# Patient Record
Sex: FEMALE | Race: WHITE | Employment: PART TIME | URBAN - METROPOLITAN AREA
[De-identification: names, ages, dates, MRNs, and addresses within clinical notes are randomized per-mention and may not be internally consistent; named-entity substitution may affect disease eponyms.]

---
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
## 2019-06-03 ENCOUNTER — HOSPITAL ENCOUNTER (OUTPATIENT)
Age: 19
Discharge: HOME | End: 2019-06-03
Attending: FAMILY MEDICINE
Payer: COMMERCIAL

## 2019-06-03 VITALS
TEMPERATURE: 98 F | SYSTOLIC BLOOD PRESSURE: 156 MMHG | DIASTOLIC BLOOD PRESSURE: 86 MMHG | OXYGEN SATURATION: 98 % | RESPIRATION RATE: 20 BRPM | HEART RATE: 81 BPM

## 2019-06-03 DIAGNOSIS — L03.011 PARONYCHIA OF RIGHT INDEX FINGER: Primary | ICD-10-CM

## 2019-06-03 DIAGNOSIS — R03.0 ELEVATED BLOOD PRESSURE READING: ICD-10-CM

## 2019-06-03 PROCEDURE — 99213 OFFICE O/P EST LOW 20 MIN: CPT

## 2019-06-03 PROCEDURE — 99214 OFFICE O/P EST MOD 30 MIN: CPT

## 2019-06-03 RX ORDER — FLUCONAZOLE 150 MG/1
TABLET ORAL
Qty: 2 TABLET | Refills: 0 | Status: SHIPPED | OUTPATIENT
Start: 2019-06-03 | End: 2019-06-06

## 2019-06-03 RX ORDER — AMOXICILLIN AND CLAVULANATE POTASSIUM 875; 125 MG/1; MG/1
1 TABLET, FILM COATED ORAL 2 TIMES DAILY
Qty: 14 TABLET | Refills: 0 | Status: SHIPPED | OUTPATIENT
Start: 2019-06-03 | End: 2019-06-10

## 2019-06-03 NOTE — ED PROVIDER NOTES
[place] [address] [facility] [place]   Patient presents with: Other    Stated Complaint: swollen pointer finger    HPI  21 yo F here with complaints of swelling and redness of the area around the nail of the R index finger.  Nail b normal   - Cap refill: normal   - sensation: normal   - Motor exam/strength/hand : normal     ED Course   Labs Reviewed - No data to display  Orders Placed This Encounter      Amoxicillin-Pot Clavulanate (AUGMENTIN) 875-125 MG Oral Tab          Sig: Ta

## 2019-07-01 ENCOUNTER — NURSE ONLY (OUTPATIENT)
Dept: FAMILY MEDICINE CLINIC | Facility: CLINIC | Age: 19
End: 2019-07-01
Payer: COMMERCIAL

## 2019-07-01 ENCOUNTER — TELEPHONE (OUTPATIENT)
Dept: FAMILY MEDICINE CLINIC | Facility: CLINIC | Age: 19
End: 2019-07-01

## 2019-07-01 DIAGNOSIS — R30.0 DYSURIA: ICD-10-CM

## 2019-07-01 DIAGNOSIS — R35.0 URINARY FREQUENCY: Primary | ICD-10-CM

## 2019-07-01 LAB
BILIRUBIN: NEGATIVE
GLUCOSE (URINE DIPSTICK): NEGATIVE MG/DL
KETONES (URINE DIPSTICK): NEGATIVE MG/DL
MULTISTIX LOT#: NORMAL NUMERIC
NITRITE, URINE: NEGATIVE
PH, URINE: 7 (ref 4.5–8)
PROTEIN (URINE DIPSTICK): NEGATIVE MG/DL
SPECIFIC GRAVITY: 1.01 (ref 1–1.03)
UROBILINOGEN,SEMI-QN: 0.2 MG/DL (ref 0–1.9)

## 2019-07-01 PROCEDURE — 87086 URINE CULTURE/COLONY COUNT: CPT | Performed by: FAMILY MEDICINE

## 2019-07-01 PROCEDURE — 87186 SC STD MICRODIL/AGAR DIL: CPT | Performed by: FAMILY MEDICINE

## 2019-07-01 PROCEDURE — 81003 URINALYSIS AUTO W/O SCOPE: CPT | Performed by: FAMILY MEDICINE

## 2019-07-01 PROCEDURE — 87088 URINE BACTERIA CULTURE: CPT | Performed by: FAMILY MEDICINE

## 2019-07-01 RX ORDER — SULFAMETHOXAZOLE AND TRIMETHOPRIM 800; 160 MG/1; MG/1
1 TABLET ORAL 2 TIMES DAILY
Qty: 10 TABLET | Refills: 0 | Status: SHIPPED | OUTPATIENT
Start: 2019-07-01 | End: 2019-07-06

## 2019-07-01 NOTE — PROGRESS NOTES
Pt here for urine dip-symptoms are frequency and burning with urination.     Dip completed  Recent Results (from the past 24 hour(s))   URINALYSIS, AUTO, W/O SCOPE    Collection Time: 07/01/19  3:41 PM   Result Value Ref Range    Glucose Urine negative mg/d

## 2019-07-01 NOTE — TELEPHONE ENCOUNTER
Patient thinks she may have a UTI x 2 days. SX: frequency & blood in urine & pain. Can one of the other MDs see her today after 3 pm or just bring in a urine sample.

## 2019-07-01 NOTE — TELEPHONE ENCOUNTER
Per DS ok to have the pt bring in a urine sample      Symptoms- lower pain, frequency and pink in the urine- no fever nausea    Asked about the pt bringing a urine sample  Mom made appt for about 330 today  Pt has taken AZO yesterday- may not be able to [facility]

## 2019-07-31 NOTE — PROGRESS NOTES
Price Munoz is a 20year old female. HPI:   Pt is here to discuss: not being herself for the past year.   Symptoms have been present for: doesn't want to get out of bed, wants to sleep all the time, flying off the handle more quickly, \"obsessing Grandfather    • Stroke Paternal Grandfather       Social History    Tobacco Use      Smoking status: Never Smoker      Smokeless tobacco: Never Used    Alcohol use: No      Alcohol/week: 0.0 standard drinks    Drug use: No         REVIEW OF SYSTEMS:   GEN

## 2019-08-26 NOTE — PROGRESS NOTES
Eleuterio Galvan is a 24year old female. Patient presents with:  Contraception: per pt       HPI:   Here for implant of nexplanon for birth control. She is generally feeling well today. No recent fevers or illness.      Reviewed history and any contr 128/96  02/06/17 : 112/78  02/03/17 : 120/80  01/30/17 : 128/82  01/03/17 : 120/80      Wt Readings from Last 6 Encounters:  05/09/17 : 172 lb (94 %*, Z = 1.52)  04/24/17 : 169 lb (93 %*, Z = 1.46)  02/06/17 : 179 lb (95 %*, Z = 1.66)  02/03/17 : 174 lb 9. 86

## 2019-08-27 NOTE — PROGRESS NOTES
Dany Mary is a 20year old female. HPI:   Patient here to f/u on lexapro. She feels so much better, so much less anxiety, mind calmer, less irritable, not checking her phone all the time for texts.   Wokring more, not secluding herself, mood murmur  PSYCH: pleasant, good affect, jokes, laughs, interactive  EXTREMITIES: no cyanosis, clubbing or edema    ASSESSMENT AND PLAN:   Diagnoses and all orders for this visit:  This visit is primarily for counseling, spent 28 min with pt >50% of time in c

## 2019-09-23 NOTE — TELEPHONE ENCOUNTER
Last refill on Escitalopram #30 with 1 refill on 7 31 2019  Last refill on Bupropion #30 with 0 refills on 8 27 2019  Last OV on 8 27 2019

## 2019-09-24 NOTE — TELEPHONE ENCOUNTER
Patient was uspposed to update me in 2 weeks after adding wellbutrin, can you please reach out to her see how she's doing with additoin of wellbutrin?  Thanks

## 2019-09-25 NOTE — TELEPHONE ENCOUNTER
Which symtpoms does she want extra help with?   Could up lexapro or wellbutrin depending on what she's struggling with

## 2019-09-25 NOTE — TELEPHONE ENCOUNTER
Spoke with the pt and she states that she is doing well no sode effects  She is seeing José Manuel Oliveros as well  wellbutrin is helping the tiredness        Wonders if she can increase her lexapro- she feels like it is helping just feels like it can help more

## 2019-09-26 RX ORDER — ESCITALOPRAM OXALATE 10 MG/1
TABLET ORAL
Qty: 90 TABLET | Refills: 0 | Status: SHIPPED | OUTPATIENT
Start: 2019-09-26 | End: 2019-11-22

## 2019-09-26 RX ORDER — BUPROPION HYDROCHLORIDE 300 MG/1
300 TABLET ORAL DAILY
Qty: 90 TABLET | Refills: 0 | Status: SHIPPED | OUTPATIENT
Start: 2019-09-26 | End: 2019-11-22

## 2019-09-26 NOTE — TELEPHONE ENCOUNTER
Pt states that if she is laying down in bed she doesn't feel like she get caet up- she needs something to help with motivation to get her day started.      Pt states she is not sure which medication would help more with this issue but she would like somethi

## 2019-10-23 ENCOUNTER — APPOINTMENT (OUTPATIENT)
Dept: GENERAL RADIOLOGY | Age: 19
End: 2019-10-23
Attending: FAMILY MEDICINE
Payer: COMMERCIAL

## 2019-10-23 ENCOUNTER — HOSPITAL ENCOUNTER (OUTPATIENT)
Age: 19
Discharge: HOME | End: 2019-10-23
Attending: FAMILY MEDICINE
Payer: COMMERCIAL

## 2019-10-23 VITALS
SYSTOLIC BLOOD PRESSURE: 128 MMHG | TEMPERATURE: 98 F | RESPIRATION RATE: 18 BRPM | HEART RATE: 90 BPM | DIASTOLIC BLOOD PRESSURE: 86 MMHG

## 2019-10-23 DIAGNOSIS — J11.1 FLU SYNDROME: Primary | ICD-10-CM

## 2019-10-23 DIAGNOSIS — J02.9 VIRAL PHARYNGITIS: ICD-10-CM

## 2019-10-23 PROCEDURE — 71046 X-RAY EXAM CHEST 2 VIEWS: CPT | Performed by: FAMILY MEDICINE

## 2019-10-23 PROCEDURE — 87081 CULTURE SCREEN ONLY: CPT | Performed by: FAMILY MEDICINE

## 2019-10-23 PROCEDURE — 99214 OFFICE O/P EST MOD 30 MIN: CPT

## 2019-10-23 PROCEDURE — 99213 OFFICE O/P EST LOW 20 MIN: CPT

## 2019-10-23 PROCEDURE — 87430 STREP A AG IA: CPT | Performed by: FAMILY MEDICINE

## 2019-10-23 NOTE — ED PROVIDER NOTES
Patient Seen in: [address] [facility] [place]      History   Patient presents with:  Sore Throat    Stated Complaint: sore throat    HPI   This is a 77-year-old female coming in with complaints of cold symptoms/URI symptoms that she had for 2 weeks th CTAB, no RRW  CV: RRR  ABD: not distended  NEURO: Alert and oriented to person place and time  GAIT: Normal          ED Course     Labs Reviewed   POCT RAPID STREP - Normal   GRP A STREP CULT, THROAT     Orders Placed This Encounter      XR CHEST PA + LAT

## 2019-10-23 NOTE — ED INITIAL ASSESSMENT (HPI)
Patient states she had cold symptoms for 2 weeks that have resolved. Woke yesterday with a sore throat.

## 2019-11-22 NOTE — TELEPHONE ENCOUNTER
Last refill on Escitalopram #90 with 0 refills on 9 26 2019  Last refill on Bupropion #90 with 0 refills on 9 26 2019  Last OV on 8 27 2019

## 2019-11-24 RX ORDER — ESCITALOPRAM OXALATE 10 MG/1
10 TABLET ORAL
Qty: 90 TABLET | Refills: 0 | Status: SHIPPED | OUTPATIENT
Start: 2019-11-24 | End: 2020-07-20

## 2019-11-24 RX ORDER — BUPROPION HYDROCHLORIDE 300 MG/1
300 TABLET ORAL DAILY
Qty: 90 TABLET | Refills: 0 | Status: SHIPPED | OUTPATIENT
Start: 2019-11-24 | End: 2020-07-20

## 2019-11-25 NOTE — TELEPHONE ENCOUNTER
Patient advised of below.   Future Appointments   Date Time Provider Alisa Urrutia   12/3/2019  2:30 PM Steve Kwan MD [facility]

## 2019-12-03 ENCOUNTER — OFFICE VISIT (OUTPATIENT)
Dept: FAMILY MEDICINE CLINIC | Facility: CLINIC | Age: 19
End: 2019-12-03
Payer: COMMERCIAL

## 2019-12-03 VITALS
HEIGHT: 67.5 IN | DIASTOLIC BLOOD PRESSURE: 84 MMHG | BODY MASS INDEX: 28.88 KG/M2 | TEMPERATURE: 98 F | SYSTOLIC BLOOD PRESSURE: 120 MMHG | HEART RATE: 90 BPM | WEIGHT: 186.19 LBS | RESPIRATION RATE: 14 BRPM

## 2019-12-03 DIAGNOSIS — Z12.4 CERVICAL CANCER SCREENING: ICD-10-CM

## 2019-12-03 DIAGNOSIS — Z23 NEED FOR VACCINATION: ICD-10-CM

## 2019-12-03 DIAGNOSIS — Z11.3 ROUTINE SCREENING FOR STI (SEXUALLY TRANSMITTED INFECTION): ICD-10-CM

## 2019-12-03 DIAGNOSIS — Z00.00 ROUTINE HISTORY AND PHYSICAL EXAMINATION OF ADULT: Primary | ICD-10-CM

## 2019-12-03 DIAGNOSIS — R53.82 CHRONIC FATIGUE: ICD-10-CM

## 2019-12-03 DIAGNOSIS — F41.1 GAD (GENERALIZED ANXIETY DISORDER): ICD-10-CM

## 2019-12-03 DIAGNOSIS — Z12.39 SCREENING BREAST EXAMINATION: ICD-10-CM

## 2019-12-03 PROCEDURE — 88175 CYTOPATH C/V AUTO FLUID REDO: CPT | Performed by: FAMILY MEDICINE

## 2019-12-03 PROCEDURE — 82306 VITAMIN D 25 HYDROXY: CPT | Performed by: FAMILY MEDICINE

## 2019-12-03 PROCEDURE — 90686 IIV4 VACC NO PRSV 0.5 ML IM: CPT | Performed by: FAMILY MEDICINE

## 2019-12-03 PROCEDURE — 87389 HIV-1 AG W/HIV-1&-2 AB AG IA: CPT | Performed by: FAMILY MEDICINE

## 2019-12-03 PROCEDURE — 87660 TRICHOMONAS VAGIN DIR PROBE: CPT | Performed by: FAMILY MEDICINE

## 2019-12-03 PROCEDURE — 87510 GARDNER VAG DNA DIR PROBE: CPT | Performed by: FAMILY MEDICINE

## 2019-12-03 PROCEDURE — 99395 PREV VISIT EST AGE 18-39: CPT | Performed by: FAMILY MEDICINE

## 2019-12-03 PROCEDURE — 87491 CHLMYD TRACH DNA AMP PROBE: CPT | Performed by: FAMILY MEDICINE

## 2019-12-03 PROCEDURE — 86780 TREPONEMA PALLIDUM: CPT | Performed by: FAMILY MEDICINE

## 2019-12-03 PROCEDURE — 86803 HEPATITIS C AB TEST: CPT | Performed by: FAMILY MEDICINE

## 2019-12-03 PROCEDURE — 87480 CANDIDA DNA DIR PROBE: CPT | Performed by: FAMILY MEDICINE

## 2019-12-03 PROCEDURE — 36415 COLL VENOUS BLD VENIPUNCTURE: CPT | Performed by: FAMILY MEDICINE

## 2019-12-03 PROCEDURE — 80050 GENERAL HEALTH PANEL: CPT | Performed by: FAMILY MEDICINE

## 2019-12-03 PROCEDURE — 82607 VITAMIN B-12: CPT | Performed by: FAMILY MEDICINE

## 2019-12-03 PROCEDURE — 82728 ASSAY OF FERRITIN: CPT | Performed by: FAMILY MEDICINE

## 2019-12-03 PROCEDURE — 87591 N.GONORRHOEAE DNA AMP PROB: CPT | Performed by: FAMILY MEDICINE

## 2019-12-03 PROCEDURE — 90471 IMMUNIZATION ADMIN: CPT | Performed by: FAMILY MEDICINE

## 2019-12-03 RX ORDER — DESOGESTREL AND ETHINYL ESTRADIOL 0.15-0.03
KIT ORAL
COMMUNITY
Start: 2019-11-24 | End: 2020-05-12 | Stop reason: ALTCHOICE

## 2019-12-03 NOTE — PATIENT INSTRUCTIONS
[facility]    [address]  [place], [address] [place] [address] Road,[facility]

## 2019-12-03 NOTE — PROGRESS NOTES
HPI:   Mart Lloyd is a 20year old female who presents for a complete physical exam.      Patient complains of vaginal skin oddness, doesn't like how it looks, 1 spot drains sometimes.   Would lie STI screening, has hx of chlamydai, had symtposm Maternal Grandfather    • Stroke Paternal Grandfather       Social History    Tobacco Use      Smoking status: Never Smoker      Smokeless tobacco: Never Used    Alcohol use: No      Alcohol/week: 0.0 standard drinks    Drug use: No       REVIEW OF SYSTEMS cyanosis, clubbing or edema  NEURO: Oriented times three,cranial nerves are intact,motor and sensory are grossly intact    ASSESSMENT AND PLAN:   Diagnoses and all orders for this visit:    Routine history and physical examination of adult  -If there is ro anxiety disorder)  -doing MUCH better on lexapro and wellbutrin, but may have lexapro SE, see above        Referred to [facility]    [address]  [place], [address] [place] [address],[facility]  For possible HS vaginal  The patient indicates understandin

## 2019-12-04 ENCOUNTER — PATIENT MESSAGE (OUTPATIENT)
Dept: FAMILY MEDICINE CLINIC | Facility: CLINIC | Age: 19
End: 2019-12-04

## 2019-12-04 RX ORDER — METRONIDAZOLE 500 MG/1
500 TABLET ORAL 2 TIMES DAILY
Qty: 14 TABLET | Refills: 0 | Status: SHIPPED | OUTPATIENT
Start: 2019-12-04 | End: 2019-12-11

## 2019-12-04 NOTE — TELEPHONE ENCOUNTER
From: Cyndi Ni  To: Eva Melchor MD  Sent: 12/4/2019 1:55 PM CST  Subject: Test Results Question    Hi Dr. Stacy Poeed! Just received my test results. I would prefer the oral antibiotic for the BV. Thank you so much!    Cyndi Ni

## 2019-12-28 ENCOUNTER — HOSPITAL ENCOUNTER (OUTPATIENT)
Age: 19
Discharge: HOME | End: 2019-12-28
Payer: COMMERCIAL

## 2019-12-28 VITALS
SYSTOLIC BLOOD PRESSURE: 121 MMHG | RESPIRATION RATE: 16 BRPM | HEART RATE: 88 BPM | OXYGEN SATURATION: 99 % | TEMPERATURE: 97 F | DIASTOLIC BLOOD PRESSURE: 71 MMHG

## 2019-12-28 DIAGNOSIS — H61.21 IMPACTED CERUMEN OF RIGHT EAR: Primary | ICD-10-CM

## 2019-12-28 PROCEDURE — 69200 CLEAR OUTER EAR CANAL: CPT

## 2019-12-28 PROCEDURE — 99213 OFFICE O/P EST LOW 20 MIN: CPT

## 2019-12-28 NOTE — ED PROVIDER NOTES
Patient Seen in: [address] [facility]      History   Patient presents with:  Ear Problem Pain  Cerumen Impaction    Stated Complaint: clogged ear    Presented today with complaints of decreased hearing in the right ear.   States similar to when membrane and ear canal normal.      Nose: Nose normal.      Mouth/Throat:      Lips: Pink. Mouth: Mucous membranes are moist.      Pharynx: Oropharynx is clear. Eyes:      Pupils: Pupils are equal, round, and reactive to light.    Neck:      Musculos

## 2019-12-28 NOTE — ED INITIAL ASSESSMENT (HPI)
Patient has cerumen impaction in her right ear. She is having trouble hearing around the wax. She previously had vertigo with this issue, but has no vertigo this time.

## 2020-01-08 ENCOUNTER — TELEPHONE (OUTPATIENT)
Dept: FAMILY MEDICINE CLINIC | Facility: CLINIC | Age: 20
End: 2020-01-08

## 2020-01-08 RX ORDER — OSELTAMIVIR PHOSPHATE 75 MG/1
75 CAPSULE ORAL 2 TIMES DAILY
Qty: 10 CAPSULE | Refills: 0 | Status: SHIPPED | OUTPATIENT
Start: 2020-01-08 | End: 2020-01-13

## 2020-01-08 NOTE — TELEPHONE ENCOUNTER
Pt would like to know if she can be seen today, She thinks she has the flu.    She has a fever of 100 off and on, chills, Achey, And diarrhea,   Or should she go to [facility]  Please return call to [phone]

## 2020-01-08 NOTE — TELEPHONE ENCOUNTER
Sounds very suspicious.   If she's feeling really lethargic and sob I'd go to , but if not I'd just treat with tamiflu 75mg po BID  X 5 days

## 2020-01-08 NOTE — TELEPHONE ENCOUNTER
Spoke with the pt and symptoms started yesterday  Fever and body aches SOB with exertion, nausea no vomiting and is having diarrhea.      Boyfriend tested positive for the flu yesterday

## 2020-01-08 NOTE — TELEPHONE ENCOUNTER
Spoke with the pt and advised of the notes and she states that she is just tired and only SOB when up moving around  Advised that if the SOB gets worse to go to UC but in the meantime will send over a script for the Tamiflu- advised to make sure she stays

## 2020-01-31 ENCOUNTER — TELEPHONE (OUTPATIENT)
Dept: FAMILY MEDICINE CLINIC | Facility: CLINIC | Age: 20
End: 2020-01-31

## 2020-03-05 ENCOUNTER — OFFICE VISIT (OUTPATIENT)
Dept: FAMILY MEDICINE CLINIC | Facility: CLINIC | Age: 20
End: 2020-03-05
Payer: COMMERCIAL

## 2020-03-05 VITALS
SYSTOLIC BLOOD PRESSURE: 128 MMHG | TEMPERATURE: 97 F | RESPIRATION RATE: 18 BRPM | OXYGEN SATURATION: 100 % | WEIGHT: 186 LBS | BODY MASS INDEX: 29 KG/M2 | HEART RATE: 90 BPM | DIASTOLIC BLOOD PRESSURE: 72 MMHG

## 2020-03-05 DIAGNOSIS — N89.8 VAGINAL DISCHARGE: Primary | ICD-10-CM

## 2020-03-05 DIAGNOSIS — Z32.00 POSSIBLE PREGNANCY, NOT CONFIRMED: ICD-10-CM

## 2020-03-05 LAB
CONTROL LINE PRESENT WITH A CLEAR BACKGROUND (YES/NO): YES YES/NO
PREGNANCY TEST, URINE: NEGATIVE

## 2020-03-05 PROCEDURE — 87510 GARDNER VAG DNA DIR PROBE: CPT | Performed by: FAMILY MEDICINE

## 2020-03-05 PROCEDURE — 81025 URINE PREGNANCY TEST: CPT | Performed by: FAMILY MEDICINE

## 2020-03-05 PROCEDURE — 87660 TRICHOMONAS VAGIN DIR PROBE: CPT | Performed by: FAMILY MEDICINE

## 2020-03-05 PROCEDURE — 87480 CANDIDA DNA DIR PROBE: CPT | Performed by: FAMILY MEDICINE

## 2020-03-05 PROCEDURE — 99213 OFFICE O/P EST LOW 20 MIN: CPT | Performed by: FAMILY MEDICINE

## 2020-03-05 NOTE — PROGRESS NOTES
Patient presents with:  Vaginal Problem   vaginal discharge     HPI:    Patient ID: Ari Muniz is a 23year old female. HPI   Patient reports being treated with BV in dec but symptoms never went completely.   Vaginal discharge since- grayish f 100%   Breastfeeding No   BMI 28.70 kg/m²    Physical Exam    Constitutional: No distress. Genitourinary:    Genitourinary Comments: Normal external genitalia. White vaginal discharge noted. There is red rrash noted over both gluteal area.  No abscess not

## 2020-03-06 ENCOUNTER — TELEPHONE (OUTPATIENT)
Dept: FAMILY MEDICINE CLINIC | Facility: CLINIC | Age: 20
End: 2020-03-06

## 2020-03-06 DIAGNOSIS — B96.89 BACTERIAL VAGINOSIS: Primary | ICD-10-CM

## 2020-03-06 DIAGNOSIS — N76.0 BACTERIAL VAGINOSIS: Primary | ICD-10-CM

## 2020-03-06 RX ORDER — CLINDAMYCIN HYDROCHLORIDE 300 MG/1
300 CAPSULE ORAL 2 TIMES DAILY
Qty: 14 CAPSULE | Refills: 0 | Status: SHIPPED | OUTPATIENT
Start: 2020-03-06 | End: 2020-03-13

## 2020-03-07 NOTE — TELEPHONE ENCOUNTER
Please call patient and inform she is positive for BV. Since she has tried metronidazole in past with no help. I will send clindamycin to pharmacy.

## 2020-03-14 ENCOUNTER — TELEPHONE (OUTPATIENT)
Dept: FAMILY MEDICINE CLINIC | Facility: CLINIC | Age: 20
End: 2020-03-14

## 2020-03-14 NOTE — TELEPHONE ENCOUNTER
Pt thinks she has a UTI again, She would like to know if we can give her something.   Pharmacy is [phone]  Please return call to [phone]

## 2020-03-16 ENCOUNTER — OFFICE VISIT (OUTPATIENT)
Dept: FAMILY MEDICINE CLINIC | Facility: CLINIC | Age: 20
End: 2020-03-16
Payer: COMMERCIAL

## 2020-03-16 VITALS
HEART RATE: 83 BPM | BODY MASS INDEX: 30 KG/M2 | RESPIRATION RATE: 18 BRPM | TEMPERATURE: 98 F | SYSTOLIC BLOOD PRESSURE: 112 MMHG | DIASTOLIC BLOOD PRESSURE: 62 MMHG | WEIGHT: 191.38 LBS | OXYGEN SATURATION: 99 %

## 2020-03-16 DIAGNOSIS — R30.0 DYSURIA: Primary | ICD-10-CM

## 2020-03-16 DIAGNOSIS — B96.89 BACTERIAL VAGINOSIS: ICD-10-CM

## 2020-03-16 DIAGNOSIS — N76.0 BACTERIAL VAGINOSIS: ICD-10-CM

## 2020-03-16 LAB
APPEARANCE: CLEAR
GLUCOSE (URINE DIPSTICK): NEGATIVE MG/DL
GLUCOSE UR STRIP.AUTO-MCNC: NEGATIVE MG/DL
KETONES (URINE DIPSTICK): NEGATIVE MG/DL
KETONES UR STRIP.AUTO-MCNC: NEGATIVE MG/DL
LEUKOCYTE ESTERASE UR QL STRIP.AUTO: NEGATIVE
LEUKOCYTES: NEGATIVE
MULTISTIX LOT#: NORMAL NUMERIC
NITRITE UR QL STRIP.AUTO: NEGATIVE
NITRITE, URINE: NEGATIVE
PH UR STRIP.AUTO: 5 [PH] (ref 4.5–8)
PH, URINE: 6.5 (ref 4.5–8)
PROT UR STRIP.AUTO-MCNC: 100 MG/DL
PROTEIN (URINE DIPSTICK): 100 MG/DL
RBC UR QL AUTO: NEGATIVE
SP GR UR STRIP.AUTO: 1.04 (ref 1–1.03)
SPECIFIC GRAVITY: 1.03 (ref 1–1.03)
UROBILINOGEN UR STRIP.AUTO-MCNC: 2 MG/DL
UROBILINOGEN,SEMI-QN: 1 MG/DL (ref 0–1.9)

## 2020-03-16 PROCEDURE — 81003 URINALYSIS AUTO W/O SCOPE: CPT | Performed by: FAMILY MEDICINE

## 2020-03-16 PROCEDURE — 81001 URINALYSIS AUTO W/SCOPE: CPT | Performed by: FAMILY MEDICINE

## 2020-03-16 PROCEDURE — 87086 URINE CULTURE/COLONY COUNT: CPT | Performed by: FAMILY MEDICINE

## 2020-03-16 PROCEDURE — 99214 OFFICE O/P EST MOD 30 MIN: CPT | Performed by: FAMILY MEDICINE

## 2020-03-16 RX ORDER — SULFAMETHOXAZOLE AND TRIMETHOPRIM 800; 160 MG/1; MG/1
1 TABLET ORAL 2 TIMES DAILY
Qty: 14 TABLET | Refills: 0 | Status: SHIPPED | OUTPATIENT
Start: 2020-03-16 | End: 2020-03-23

## 2020-03-16 NOTE — PROGRESS NOTES
Cleve Player is a 23year old female. HPI:   Waking up sweating last night, back ache, urinary frequency but not much comes out, dysuria. No hematuria. No body aches or chills. Feels like her last UTI but worse. Feels tired. No vomiting. lesions  NECK: supple,no adenopathy,no JVD, no thyromegaly  LUNGS: clear to auscultation  CARDIO: RRR without murmur  GI: no tenderness except mild suprapubic tenderness and mild L CVA tendernss  EXTREMITIES: no cyanosis, clubbing or edema    ASSESSMENT AN

## 2020-03-26 ENCOUNTER — TELEPHONE (OUTPATIENT)
Dept: FAMILY MEDICINE CLINIC | Facility: CLINIC | Age: 20
End: 2020-03-26

## 2020-03-26 ENCOUNTER — NURSE ONLY (OUTPATIENT)
Dept: FAMILY MEDICINE CLINIC | Facility: CLINIC | Age: 20
End: 2020-03-26
Payer: COMMERCIAL

## 2020-03-26 DIAGNOSIS — R30.0 DYSURIA: ICD-10-CM

## 2020-03-26 DIAGNOSIS — R30.0 DYSURIA: Primary | ICD-10-CM

## 2020-03-26 LAB
BILIRUB UR QL STRIP.AUTO: NEGATIVE
COLOR UR AUTO: YELLOW
GLUCOSE UR STRIP.AUTO-MCNC: NEGATIVE MG/DL
KETONES UR STRIP.AUTO-MCNC: NEGATIVE MG/DL
NITRITE UR QL STRIP.AUTO: NEGATIVE
PH UR STRIP.AUTO: 6 [PH] (ref 4.5–8)
PROT UR STRIP.AUTO-MCNC: NEGATIVE MG/DL
RBC UR QL AUTO: NEGATIVE
SP GR UR STRIP.AUTO: 1.02 (ref 1–1.03)
UROBILINOGEN UR STRIP.AUTO-MCNC: <2 MG/DL

## 2020-03-26 PROCEDURE — 87591 N.GONORRHOEAE DNA AMP PROB: CPT | Performed by: FAMILY MEDICINE

## 2020-03-26 PROCEDURE — 81001 URINALYSIS AUTO W/SCOPE: CPT | Performed by: FAMILY MEDICINE

## 2020-03-26 PROCEDURE — 87491 CHLMYD TRACH DNA AMP PROBE: CPT | Performed by: FAMILY MEDICINE

## 2020-03-26 NOTE — TELEPHONE ENCOUNTER
Pt was seen on 3/16 for possible UTI    Pt UA came back clean and pt states PARVEEN advised that pt should come back in for follow up testing. ..possibly r/o Chalmydia is her sxs were not resolving.   Pt states sxs are the same, maybe even a little worse with d

## 2020-03-26 NOTE — TELEPHONE ENCOUNTER
Pt was advised- pt also mentioned that one of her past partners contacted her yesterday and he does have chlamydia    Future Appointments   Date Time Provider Alisa Urrutia   3/26/2020 11:00 AM  [facility] Vilma Tolliver

## 2020-03-26 NOTE — TELEPHONE ENCOUNTER
I placed the orders. If at all possible I'd like her to do a dirty THEN clean catch (come on kegels!) and orders for both placed, but for sure we need the dirty for GC/Chlam so prioritize that.

## 2020-03-26 NOTE — TELEPHONE ENCOUNTER
Pt was seen last week for a poss UTI. The test was negative but was told to call if still having the same issues. Pt is and would like to be tested again.  Please call back

## 2020-03-27 LAB
C TRACH DNA SPEC QL NAA+PROBE: NEGATIVE
N GONORRHOEA DNA SPEC QL NAA+PROBE: NEGATIVE

## 2020-05-14 ENCOUNTER — OFFICE VISIT (OUTPATIENT)
Dept: FAMILY MEDICINE CLINIC | Facility: CLINIC | Age: 20
End: 2020-05-14
Payer: COMMERCIAL

## 2020-05-14 VITALS
RESPIRATION RATE: 16 BRPM | OXYGEN SATURATION: 97 % | TEMPERATURE: 97 F | SYSTOLIC BLOOD PRESSURE: 122 MMHG | DIASTOLIC BLOOD PRESSURE: 80 MMHG | WEIGHT: 191.19 LBS | HEART RATE: 88 BPM | BODY MASS INDEX: 30 KG/M2

## 2020-05-14 DIAGNOSIS — N89.8 VAGINAL IRRITATION: Primary | ICD-10-CM

## 2020-05-14 PROCEDURE — 87480 CANDIDA DNA DIR PROBE: CPT | Performed by: FAMILY MEDICINE

## 2020-05-14 PROCEDURE — 87491 CHLMYD TRACH DNA AMP PROBE: CPT | Performed by: FAMILY MEDICINE

## 2020-05-14 PROCEDURE — 99213 OFFICE O/P EST LOW 20 MIN: CPT | Performed by: FAMILY MEDICINE

## 2020-05-14 PROCEDURE — 87591 N.GONORRHOEAE DNA AMP PROB: CPT | Performed by: FAMILY MEDICINE

## 2020-05-14 PROCEDURE — 87510 GARDNER VAG DNA DIR PROBE: CPT | Performed by: FAMILY MEDICINE

## 2020-05-14 PROCEDURE — 87660 TRICHOMONAS VAGIN DIR PROBE: CPT | Performed by: FAMILY MEDICINE

## 2020-05-14 NOTE — PROGRESS NOTES
Baldo Garcia is a 23year old female. HPI:   Patient had sex a week ago, 24-48hrs later had vaginal irritation, itcing a slight increase in discharge. No fevers/chills. Minimal pain at end of urination. No pelvic pain. No rash.   Has hx of BV a lesions of perineum  LUNGS: normal resp effort  CARDIO: well perfused  : normal external vagina; internal vagina normal walls, no lesions, no CMT, no pathologic appaering d/c    ASSESSMENT AND PLAN:   Diagnoses and all orders for this visit:    Vaginal i

## 2020-07-20 ENCOUNTER — OFFICE VISIT (OUTPATIENT)
Dept: FAMILY MEDICINE CLINIC | Facility: CLINIC | Age: 20
End: 2020-07-20
Payer: COMMERCIAL

## 2020-07-20 VITALS
BODY MASS INDEX: 29.86 KG/M2 | HEIGHT: 68 IN | HEART RATE: 82 BPM | TEMPERATURE: 98 F | SYSTOLIC BLOOD PRESSURE: 112 MMHG | DIASTOLIC BLOOD PRESSURE: 66 MMHG | OXYGEN SATURATION: 99 % | WEIGHT: 197 LBS | RESPIRATION RATE: 18 BRPM

## 2020-07-20 DIAGNOSIS — N76.0 ACUTE VAGINITIS: Primary | ICD-10-CM

## 2020-07-20 DIAGNOSIS — L73.2 HIDRADENITIS SUPPURATIVA: ICD-10-CM

## 2020-07-20 DIAGNOSIS — F41.1 GAD (GENERALIZED ANXIETY DISORDER): ICD-10-CM

## 2020-07-20 DIAGNOSIS — A54.9 GONORRHEA: ICD-10-CM

## 2020-07-20 DIAGNOSIS — F33.0 MILD EPISODE OF RECURRENT MAJOR DEPRESSIVE DISORDER (HCC): ICD-10-CM

## 2020-07-20 PROCEDURE — 3008F BODY MASS INDEX DOCD: CPT | Performed by: FAMILY MEDICINE

## 2020-07-20 PROCEDURE — 99214 OFFICE O/P EST MOD 30 MIN: CPT | Performed by: FAMILY MEDICINE

## 2020-07-20 PROCEDURE — 3078F DIAST BP <80 MM HG: CPT | Performed by: FAMILY MEDICINE

## 2020-07-20 PROCEDURE — 87510 GARDNER VAG DNA DIR PROBE: CPT | Performed by: FAMILY MEDICINE

## 2020-07-20 PROCEDURE — 87480 CANDIDA DNA DIR PROBE: CPT | Performed by: FAMILY MEDICINE

## 2020-07-20 PROCEDURE — 87591 N.GONORRHOEAE DNA AMP PROB: CPT | Performed by: FAMILY MEDICINE

## 2020-07-20 PROCEDURE — 87491 CHLMYD TRACH DNA AMP PROBE: CPT | Performed by: FAMILY MEDICINE

## 2020-07-20 PROCEDURE — 87660 TRICHOMONAS VAGIN DIR PROBE: CPT | Performed by: FAMILY MEDICINE

## 2020-07-20 PROCEDURE — 3074F SYST BP LT 130 MM HG: CPT | Performed by: FAMILY MEDICINE

## 2020-07-20 RX ORDER — ESCITALOPRAM OXALATE 10 MG/1
10 TABLET ORAL
Qty: 90 TABLET | Refills: 1 | Status: SHIPPED | OUTPATIENT
Start: 2020-07-20 | End: 2020-10-02 | Stop reason: ALTCHOICE

## 2020-07-20 RX ORDER — BUPROPION HYDROCHLORIDE 300 MG/1
300 TABLET ORAL DAILY
Qty: 90 TABLET | Refills: 1 | Status: SHIPPED | OUTPATIENT
Start: 2020-07-20 | End: 2020-10-02

## 2020-07-20 NOTE — PROGRESS NOTES
Yesi Bhatia is a 23year old female. HPI:   Patient here for a few reasons:    1) she's had thin yellow-alberto discharge for about a week with foul odor. Hasn't had this before.   Had sex with a new partner ithin the week prior to her symptoms shalini 97.7 °F (36.5 °C) (Temporal)   Resp 18   Ht 68\"   Wt 197 lb (89.4 kg)   LMP 05/01/2020   SpO2 99%   BMI 29.95 kg/m²   GENERAL: well developed, well nourished,in no apparent distress  SKIN: no rashes,no suspicious lesions  HEENT: atraumatic, normocephalic,

## 2020-07-21 LAB
C TRACH DNA SPEC QL NAA+PROBE: NEGATIVE
N GONORRHOEA DNA SPEC QL NAA+PROBE: POSITIVE

## 2020-07-21 RX ORDER — METRONIDAZOLE 500 MG/1
500 TABLET ORAL 2 TIMES DAILY
Qty: 14 TABLET | Refills: 0 | Status: SHIPPED | OUTPATIENT
Start: 2020-07-21 | End: 2020-10-12

## 2020-07-23 ENCOUNTER — NURSE ONLY (OUTPATIENT)
Dept: FAMILY MEDICINE CLINIC | Facility: CLINIC | Age: 20
End: 2020-07-23
Payer: COMMERCIAL

## 2020-07-23 PROCEDURE — 96372 THER/PROPH/DIAG INJ SC/IM: CPT | Performed by: FAMILY MEDICINE

## 2020-07-23 RX ORDER — CEFTRIAXONE SODIUM 250 MG/1
250 INJECTION, POWDER, FOR SOLUTION INTRAMUSCULAR; INTRAVENOUS ONCE
Status: COMPLETED | OUTPATIENT
Start: 2020-07-23 | End: 2020-07-23

## 2020-07-23 RX ADMIN — CEFTRIAXONE SODIUM 250 MG: 250 INJECTION, POWDER, FOR SOLUTION INTRAMUSCULAR; INTRAVENOUS at 15:19:00

## 2020-07-23 NOTE — PATIENT INSTRUCTIONS
Patient here for Ceftraxone injection 250mg  Xylociane 1% 0.9ml  Lot # [id number]  Exp 6 2023  Patient waited for 15 minutes after injection

## 2020-08-03 ENCOUNTER — TELEPHONE (OUTPATIENT)
Dept: FAMILY MEDICINE CLINIC | Facility: CLINIC | Age: 20
End: 2020-08-03

## 2020-08-03 NOTE — TELEPHONE ENCOUNTER
Call from Willem Rosas with [facility]. Needed to confirm that patient was treated for STD. Advised received rocephin inj on 7/23/20  Also on flagyl 500mg BID for 7 days  No further questions.

## 2020-08-20 ENCOUNTER — HOSPITAL ENCOUNTER (OUTPATIENT)
Age: 20
Discharge: HOME | End: 2020-08-20
Payer: COMMERCIAL

## 2020-08-20 VITALS
HEART RATE: 80 BPM | SYSTOLIC BLOOD PRESSURE: 134 MMHG | DIASTOLIC BLOOD PRESSURE: 83 MMHG | TEMPERATURE: 98 F | RESPIRATION RATE: 18 BRPM | OXYGEN SATURATION: 99 %

## 2020-08-20 DIAGNOSIS — N30.00 ACUTE CYSTITIS WITHOUT HEMATURIA: Primary | ICD-10-CM

## 2020-08-20 DIAGNOSIS — R35.0 URINARY FREQUENCY: ICD-10-CM

## 2020-08-20 LAB
POCT BILIRUBIN URINE: NEGATIVE
POCT GLUCOSE URINE: 100 MG/DL
POCT KETONE URINE: NEGATIVE MG/DL
POCT LEUKOCYTE ESTERASE URINE: NEGATIVE
POCT NITRITE URINE: POSITIVE
POCT PH URINE: 6.5 (ref 5–8)
POCT PROTEIN URINE: NEGATIVE MG/DL
POCT SPECIFIC GRAVITY URINE: 1.02
POCT URINE CLARITY: CLEAR
POCT URINE PREGNANCY: NEGATIVE
POCT UROBILINOGEN URINE: 1 MG/DL

## 2020-08-20 PROCEDURE — 87077 CULTURE AEROBIC IDENTIFY: CPT | Performed by: NURSE PRACTITIONER

## 2020-08-20 PROCEDURE — 99213 OFFICE O/P EST LOW 20 MIN: CPT | Performed by: NURSE PRACTITIONER

## 2020-08-20 PROCEDURE — 87086 URINE CULTURE/COLONY COUNT: CPT | Performed by: NURSE PRACTITIONER

## 2020-08-20 PROCEDURE — 81025 URINE PREGNANCY TEST: CPT | Performed by: NURSE PRACTITIONER

## 2020-08-20 PROCEDURE — 81002 URINALYSIS NONAUTO W/O SCOPE: CPT | Performed by: NURSE PRACTITIONER

## 2020-08-20 RX ORDER — SULFAMETHOXAZOLE AND TRIMETHOPRIM 800; 160 MG/1; MG/1
1 TABLET ORAL 2 TIMES DAILY
Qty: 14 TABLET | Refills: 0 | Status: SHIPPED | OUTPATIENT
Start: 2020-08-20 | End: 2020-08-27

## 2020-08-20 NOTE — ED INITIAL ASSESSMENT (HPI)
Pt with c/o urinary frequency and urgency. Pt with symptoms for 2 days.   Pt took azo starting today

## 2020-08-20 NOTE — ED PROVIDER NOTES
[place] [address] [facility] [place]      [place]   Patient presents with:  Urinary Symptoms    Stated Complaint: UTI, DN, FNP-C    59-year-old female presents today with complaints of urinary frequency urgency with some dysuria.   Has had gerson neck supple. Cardiovascular:      Rate and Rhythm: Normal rate. Pulmonary:      Effort: Pulmonary effort is normal.   Abdominal:      General: Bowel sounds are normal. There is no distension. Tenderness: There is no tenderness.  There is no right C

## 2020-08-22 NOTE — PROGRESS NOTES
Patient is on Bactrim. Urine cx shows Staphylococcus Saprophyticus and states that it responds to Trimethoprim Sulfa. Left message for patient. Instructed to return call if any questions or concerns.

## 2020-09-27 ENCOUNTER — HOSPITAL ENCOUNTER (OUTPATIENT)
Age: 20
Discharge: OTHER INSTITUTION | End: 2020-09-27
Payer: COMMERCIAL

## 2020-09-27 VITALS
BODY MASS INDEX: 29 KG/M2 | RESPIRATION RATE: 16 BRPM | OXYGEN SATURATION: 98 % | WEIGHT: 190 LBS | SYSTOLIC BLOOD PRESSURE: 128 MMHG | HEART RATE: 102 BPM | DIASTOLIC BLOOD PRESSURE: 98 MMHG | TEMPERATURE: 98 F
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
## 2020-09-27 DIAGNOSIS — R11.2 INTRACTABLE VOMITING WITH NAUSEA, UNSPECIFIED VOMITING TYPE: ICD-10-CM

## 2020-09-27 PROCEDURE — 99214 OFFICE O/P EST MOD 30 MIN: CPT | Performed by: PHYSICIAN ASSISTANT
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
## 2020-10-02 ENCOUNTER — OFFICE VISIT (OUTPATIENT)
Dept: FAMILY MEDICINE CLINIC | Facility: CLINIC | Age: 20
End: 2020-10-02
Payer: COMMERCIAL

## 2020-10-02 VITALS
TEMPERATURE: 97 F | OXYGEN SATURATION: 99 % | RESPIRATION RATE: 20 BRPM | HEART RATE: 66 BPM | SYSTOLIC BLOOD PRESSURE: 120 MMHG | DIASTOLIC BLOOD PRESSURE: 72 MMHG | BODY MASS INDEX: 29.83 KG/M2 | HEIGHT: 69 IN | WEIGHT: 201.38 LBS

## 2020-10-02 DIAGNOSIS — M62.838 MUSCLE SPASM: ICD-10-CM
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
## 2020-10-02 DIAGNOSIS — F33.1 MODERATE EPISODE OF RECURRENT MAJOR DEPRESSIVE DISORDER (HCC): ICD-10-CM

## 2020-10-02 DIAGNOSIS — M54.2 NECK PAIN: Primary | ICD-10-CM

## 2020-10-02 DIAGNOSIS — M54.50 ACUTE BILATERAL LOW BACK PAIN WITHOUT SCIATICA: ICD-10-CM

## 2020-10-02 DIAGNOSIS — F41.1 GAD (GENERALIZED ANXIETY DISORDER): ICD-10-CM

## 2020-10-02 PROCEDURE — 99214 OFFICE O/P EST MOD 30 MIN: CPT | Performed by: FAMILY MEDICINE

## 2020-10-02 PROCEDURE — 3074F SYST BP LT 130 MM HG: CPT | Performed by: FAMILY MEDICINE

## 2020-10-02 PROCEDURE — 3008F BODY MASS INDEX DOCD: CPT | Performed by: FAMILY MEDICINE

## 2020-10-02 PROCEDURE — 3078F DIAST BP <80 MM HG: CPT | Performed by: FAMILY MEDICINE

## 2020-10-02 RX ORDER — BUPROPION HYDROCHLORIDE 300 MG/1
300 TABLET ORAL DAILY
Qty: 90 TABLET | Refills: 1 | Status: SHIPPED | OUTPATIENT
Start: 2020-10-02 | End: 2021-01-27
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
## 2020-10-06 ENCOUNTER — LABORATORY ENCOUNTER (OUTPATIENT)
Dept: LAB | Age: 20
End: 2020-10-06
Attending: FAMILY MEDICINE
Payer: COMMERCIAL

## 2020-10-06 ENCOUNTER — OFFICE VISIT (OUTPATIENT)
Dept: FAMILY MEDICINE CLINIC | Facility: CLINIC | Age: 20
End: 2020-10-06
Payer: COMMERCIAL

## 2020-10-06 VITALS
RESPIRATION RATE: 18 BRPM | BODY MASS INDEX: 28.88 KG/M2 | HEIGHT: 69 IN | DIASTOLIC BLOOD PRESSURE: 70 MMHG | OXYGEN SATURATION: 97 % | SYSTOLIC BLOOD PRESSURE: 112 MMHG | TEMPERATURE: 99 F | HEART RATE: 85 BPM | WEIGHT: 195 LBS

## 2020-10-06 DIAGNOSIS — Z11.3 ROUTINE SCREENING FOR STI (SEXUALLY TRANSMITTED INFECTION): Primary | ICD-10-CM

## 2020-10-06 DIAGNOSIS — Z86.19 HISTORY OF GONORRHEA: ICD-10-CM

## 2020-10-06 DIAGNOSIS — Z11.3 ROUTINE SCREENING FOR STI (SEXUALLY TRANSMITTED INFECTION): ICD-10-CM

## 2020-10-06 PROCEDURE — 86803 HEPATITIS C AB TEST: CPT | Performed by: FAMILY MEDICINE

## 2020-10-06 PROCEDURE — 3074F SYST BP LT 130 MM HG: CPT | Performed by: FAMILY MEDICINE

## 2020-10-06 PROCEDURE — 87491 CHLMYD TRACH DNA AMP PROBE: CPT | Performed by: FAMILY MEDICINE

## 2020-10-06 PROCEDURE — 86780 TREPONEMA PALLIDUM: CPT | Performed by: FAMILY MEDICINE

## 2020-10-06 PROCEDURE — 99213 OFFICE O/P EST LOW 20 MIN: CPT | Performed by: FAMILY MEDICINE

## 2020-10-06 PROCEDURE — 3008F BODY MASS INDEX DOCD: CPT | Performed by: FAMILY MEDICINE

## 2020-10-06 PROCEDURE — 87591 N.GONORRHOEAE DNA AMP PROB: CPT | Performed by: FAMILY MEDICINE

## 2020-10-06 PROCEDURE — 36415 COLL VENOUS BLD VENIPUNCTURE: CPT | Performed by: FAMILY MEDICINE

## 2020-10-06 PROCEDURE — 87480 CANDIDA DNA DIR PROBE: CPT | Performed by: FAMILY MEDICINE

## 2020-10-06 PROCEDURE — 3078F DIAST BP <80 MM HG: CPT | Performed by: FAMILY MEDICINE

## 2020-10-06 PROCEDURE — 87510 GARDNER VAG DNA DIR PROBE: CPT | Performed by: FAMILY MEDICINE

## 2020-10-06 PROCEDURE — 87660 TRICHOMONAS VAGIN DIR PROBE: CPT | Performed by: FAMILY MEDICINE

## 2020-10-06 PROCEDURE — 87389 HIV-1 AG W/HIV-1&-2 AB AG IA: CPT | Performed by: FAMILY MEDICINE

## 2020-10-06 RX ORDER — CEPHALEXIN 500 MG/1
500 CAPSULE ORAL 2 TIMES DAILY
COMMUNITY
Start: 2020-09-28 | End: 2020-11-24

## 2020-10-08 ENCOUNTER — PATIENT MESSAGE (OUTPATIENT)
Dept: FAMILY MEDICINE CLINIC | Facility: CLINIC | Age: 20
End: 2020-10-08

## 2020-10-08 NOTE — TELEPHONE ENCOUNTER
From: Yesi Bhatia  To: John Amaral MD  Sent: 10/8/2020 1:05 PM CDT  Subject: Test Results Question    I am noticing a little bit of an odor, I was actually going to ask if you did test for the BV when I received my results.  Is there anyway I co

## 2020-10-12 RX ORDER — METRONIDAZOLE 500 MG/1
500 TABLET ORAL 2 TIMES DAILY
Qty: 14 TABLET | Refills: 0 | Status: SHIPPED | OUTPATIENT
Start: 2020-10-12 | End: 2020-10-19

## 2020-11-02 NOTE — PROGRESS NOTES
Caren Maguire is a 23year old female. HPI:   Patient here for f/u STI testing following her prior infection and treatment. She is asymptomatmic, no concerns. Has had new partners since then, would like full STI panel.   Current [facility] speculum exam with scant white discharge in post vault; no CMT  EXTREMITIES: no cyanosis, clubbing or edema    ASSESSMENT AND PLAN:   Diagnoses and all orders for this visit:    Routine screening for STI (sexually transmitted infection)  -     CHLAMYDIA/GO

## 2020-11-09 ENCOUNTER — PATIENT MESSAGE (OUTPATIENT)
Dept: FAMILY MEDICINE CLINIC | Facility: CLINIC | Age: 20
End: 2020-11-09

## 2020-11-10 NOTE — TELEPHONE ENCOUNTER
From: Xena Hough  To: Ambreen Patterson MD  Sent: 11/9/2020 6:46 PM CST  Subject: Non-Urgent Medical Question    Hi Dr. Marco Antonio Martinez! I'm still having some weird discharge a couple weeks after finishing the prescription for BV.  Is there anything else I coul

## 2020-11-15 RX ORDER — METRONIDAZOLE 500 MG/1
500 TABLET ORAL 2 TIMES DAILY
Qty: 14 TABLET | Refills: 0 | Status: SHIPPED | OUTPATIENT
Start: 2020-11-15 | End: 2020-11-22

## 2020-11-24 ENCOUNTER — HOSPITAL ENCOUNTER (OUTPATIENT)
Age: 20
Discharge: HOME | End: 2020-11-24
Payer: COMMERCIAL

## 2020-11-24 VITALS
HEART RATE: 94 BPM | RESPIRATION RATE: 16 BRPM | WEIGHT: 190 LBS | BODY MASS INDEX: 28 KG/M2 | SYSTOLIC BLOOD PRESSURE: 126 MMHG | OXYGEN SATURATION: 99 % | DIASTOLIC BLOOD PRESSURE: 85 MMHG | TEMPERATURE: 99 F

## 2020-11-24 DIAGNOSIS — J02.9 SORE THROAT IN THE MORNING: ICD-10-CM

## 2020-11-24 DIAGNOSIS — R09.82 PND (POST-NASAL DRIP): ICD-10-CM

## 2020-11-24 DIAGNOSIS — R09.81 CONGESTION OF NASAL SINUS: Primary | ICD-10-CM

## 2020-11-24 PROCEDURE — 87081 CULTURE SCREEN ONLY: CPT | Performed by: PHYSICIAN ASSISTANT

## 2020-11-24 PROCEDURE — 99213 OFFICE O/P EST LOW 20 MIN: CPT | Performed by: PHYSICIAN ASSISTANT

## 2020-11-24 PROCEDURE — 87147 CULTURE TYPE IMMUNOLOGIC: CPT | Performed by: PHYSICIAN ASSISTANT

## 2020-11-24 PROCEDURE — 87880 STREP A ASSAY W/OPTIC: CPT | Performed by: PHYSICIAN ASSISTANT

## 2020-11-24 NOTE — ED PROVIDER NOTES
Patient Seen in: Immediate [facility]      History   Patient presents with:  Cough/URI  Body ache and/or chills  Sore Throat    Stated Complaint: sore throat    HPI    79-year-old female here with complaint of sore throat for the past 3 days.   Patient d reviewed. Constitutional:       Appearance: She is well-developed. HENT:      Head: Normocephalic. Jaw: There is normal jaw occlusion.       Right Ear: Tympanic membrane and external ear normal.      Left Ear: Tympanic membrane and external ear nor expresses understanding. All questions and concerns are addressed to the patients satisfaction prior to discharge today.                            Disposition and Plan     Clinical Impression:  Congestion of nasal sinus  (primary encounter diagnosis)  PND

## 2020-11-24 NOTE — ED INITIAL ASSESSMENT (HPI)
Pt was exposed to Strep last week    Sunday Pt c/o sore throat, body aches, chest/nasal congestion, (Tmax 99.5)

## 2020-12-15 ENCOUNTER — OFFICE VISIT (OUTPATIENT)
Dept: FAMILY MEDICINE CLINIC | Facility: CLINIC | Age: 20
End: 2020-12-15
Payer: COMMERCIAL

## 2020-12-15 VITALS
BODY MASS INDEX: 29.47 KG/M2 | TEMPERATURE: 98 F | HEART RATE: 86 BPM | DIASTOLIC BLOOD PRESSURE: 82 MMHG | HEIGHT: 69 IN | WEIGHT: 199 LBS | SYSTOLIC BLOOD PRESSURE: 122 MMHG | RESPIRATION RATE: 16 BRPM | OXYGEN SATURATION: 98 %

## 2020-12-15 DIAGNOSIS — Z86.16 HISTORY OF 2019 NOVEL CORONAVIRUS DISEASE (COVID-19): ICD-10-CM

## 2020-12-15 DIAGNOSIS — R23.8 EASY BRUISING: ICD-10-CM

## 2020-12-15 DIAGNOSIS — N89.8 VAGINAL DISCHARGE: ICD-10-CM

## 2020-12-15 DIAGNOSIS — R03.0 ELEVATED BLOOD PRESSURE, SITUATIONAL: ICD-10-CM

## 2020-12-15 DIAGNOSIS — N92.6 MISSED PERIOD: Primary | ICD-10-CM

## 2020-12-15 PROCEDURE — 36415 COLL VENOUS BLD VENIPUNCTURE: CPT | Performed by: FAMILY MEDICINE

## 2020-12-15 PROCEDURE — 87510 GARDNER VAG DNA DIR PROBE: CPT | Performed by: FAMILY MEDICINE

## 2020-12-15 PROCEDURE — 87591 N.GONORRHOEAE DNA AMP PROB: CPT | Performed by: FAMILY MEDICINE

## 2020-12-15 PROCEDURE — 3079F DIAST BP 80-89 MM HG: CPT | Performed by: FAMILY MEDICINE

## 2020-12-15 PROCEDURE — 87491 CHLMYD TRACH DNA AMP PROBE: CPT | Performed by: FAMILY MEDICINE

## 2020-12-15 PROCEDURE — 87480 CANDIDA DNA DIR PROBE: CPT | Performed by: FAMILY MEDICINE

## 2020-12-15 PROCEDURE — 84702 CHORIONIC GONADOTROPIN TEST: CPT | Performed by: FAMILY MEDICINE

## 2020-12-15 PROCEDURE — 81025 URINE PREGNANCY TEST: CPT | Performed by: FAMILY MEDICINE

## 2020-12-15 PROCEDURE — 3008F BODY MASS INDEX DOCD: CPT | Performed by: FAMILY MEDICINE

## 2020-12-15 PROCEDURE — 3074F SYST BP LT 130 MM HG: CPT | Performed by: FAMILY MEDICINE

## 2020-12-15 PROCEDURE — 87660 TRICHOMONAS VAGIN DIR PROBE: CPT | Performed by: FAMILY MEDICINE

## 2020-12-15 PROCEDURE — 99214 OFFICE O/P EST MOD 30 MIN: CPT | Performed by: FAMILY MEDICINE

## 2020-12-15 NOTE — PROGRESS NOTES
Joe Dejesus is a 23year old female. HPI:   Patient c/o missing her period. LMP was 11/17, this period was supposed to have started 2-3 days ago. Had scant spotting on Friday, that was it. HAs felt nauseated since then. No pelvic pain.   Of no thighs  HEENT: atraumatic, normocephalic  LUNGS: normal resp effort  CARDIO: RRR, well pefused  : normal perineum, normal external vaginal, normal speculum exam apart from ~1 tblspn thick yellow discharge posterior vault, no CMT nor active cervical disch

## 2020-12-17 ENCOUNTER — TELEPHONE (OUTPATIENT)
Dept: FAMILY MEDICINE CLINIC | Facility: CLINIC | Age: 20
End: 2020-12-17

## 2020-12-17 RX ORDER — CLINDAMYCIN HYDROCHLORIDE 300 MG/1
300 CAPSULE ORAL 2 TIMES DAILY
Qty: 14 CAPSULE | Refills: 0 | Status: SHIPPED | OUTPATIENT
Start: 2020-12-17 | End: 2020-12-24

## 2020-12-17 RX ORDER — AZITHROMYCIN 500 MG/1
1000 TABLET, FILM COATED ORAL DAILY
Qty: 2 TABLET | Refills: 0 | Status: SHIPPED | OUTPATIENT
Start: 2020-12-17 | End: 2020-12-18

## 2021-01-27 ENCOUNTER — OFFICE VISIT (OUTPATIENT)
Dept: FAMILY MEDICINE CLINIC | Facility: CLINIC | Age: 21
End: 2021-01-27
Payer: COMMERCIAL

## 2021-01-27 VITALS
OXYGEN SATURATION: 99 % | HEIGHT: 67.5 IN | HEART RATE: 76 BPM | DIASTOLIC BLOOD PRESSURE: 80 MMHG | TEMPERATURE: 97 F | SYSTOLIC BLOOD PRESSURE: 110 MMHG | BODY MASS INDEX: 30.93 KG/M2 | WEIGHT: 199.38 LBS

## 2021-01-27 DIAGNOSIS — Z00.00 ROUTINE HISTORY AND PHYSICAL EXAMINATION OF ADULT: Primary | ICD-10-CM

## 2021-01-27 DIAGNOSIS — Z11.3 ROUTINE SCREENING FOR STI (SEXUALLY TRANSMITTED INFECTION): ICD-10-CM

## 2021-01-27 DIAGNOSIS — Z12.4 CERVICAL CANCER SCREENING: ICD-10-CM

## 2021-01-27 DIAGNOSIS — Z12.39 SCREENING BREAST EXAMINATION: ICD-10-CM

## 2021-01-27 DIAGNOSIS — F41.1 GAD (GENERALIZED ANXIETY DISORDER): ICD-10-CM

## 2021-01-27 DIAGNOSIS — Z23 NEED FOR VACCINATION: ICD-10-CM

## 2021-01-27 PROCEDURE — 87491 CHLMYD TRACH DNA AMP PROBE: CPT | Performed by: FAMILY MEDICINE

## 2021-01-27 PROCEDURE — 3074F SYST BP LT 130 MM HG: CPT | Performed by: FAMILY MEDICINE

## 2021-01-27 PROCEDURE — 90471 IMMUNIZATION ADMIN: CPT | Performed by: FAMILY MEDICINE

## 2021-01-27 PROCEDURE — 3008F BODY MASS INDEX DOCD: CPT | Performed by: FAMILY MEDICINE

## 2021-01-27 PROCEDURE — 88175 CYTOPATH C/V AUTO FLUID REDO: CPT | Performed by: FAMILY MEDICINE

## 2021-01-27 PROCEDURE — 3079F DIAST BP 80-89 MM HG: CPT | Performed by: FAMILY MEDICINE

## 2021-01-27 PROCEDURE — 90715 TDAP VACCINE 7 YRS/> IM: CPT | Performed by: FAMILY MEDICINE

## 2021-01-27 PROCEDURE — 99395 PREV VISIT EST AGE 18-39: CPT | Performed by: FAMILY MEDICINE

## 2021-01-27 PROCEDURE — 87510 GARDNER VAG DNA DIR PROBE: CPT | Performed by: FAMILY MEDICINE

## 2021-01-27 PROCEDURE — 87591 N.GONORRHOEAE DNA AMP PROB: CPT | Performed by: FAMILY MEDICINE

## 2021-01-27 PROCEDURE — 87660 TRICHOMONAS VAGIN DIR PROBE: CPT | Performed by: FAMILY MEDICINE

## 2021-01-27 PROCEDURE — 87480 CANDIDA DNA DIR PROBE: CPT | Performed by: FAMILY MEDICINE

## 2021-01-27 NOTE — PROGRESS NOTES
HPI:   Price Munoz is a 23year old female who presents for a complete physical exam.      Patient complains of nothing major, feels well, feels fine ovrall. Would like to be retested for STIs and BV.   Gets vaginal itching now and then, slight s 0.0 standard drinks      Frequency: 2-4 times a month      Drinks per session: 1 or 2      Comment: soc    Drug use: Yes      Types: Cannabis       REVIEW OF SYSTEMS:   GENERAL: feels well in general, no unexpected weight changes, no fevers  See HPI    EXA 0-2/day for men. -declines flu and Hep A vaccines  -     CHLAMYDIA/GONOCOCCUS, JHONY;  Future  -     VAGINITIS/VAGINOSIS, DNA PROBE; Future  -     THINPREP PAP WITH HPV REFLEX REQUEST B; Future    HERB (generalized anxiety disorder)  -.doing well off meds  Ne

## 2021-01-29 LAB
C TRACH DNA SPEC QL NAA+PROBE: NEGATIVE
N GONORRHOEA DNA SPEC QL NAA+PROBE: NEGATIVE

## 2021-02-02 LAB — LAST PAP RESULT: NORMAL

## 2021-02-11 ENCOUNTER — PATIENT MESSAGE (OUTPATIENT)
Dept: FAMILY MEDICINE CLINIC | Facility: CLINIC | Age: 21
End: 2021-02-11
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
## 2021-02-14 RX ORDER — BUPROPION HYDROCHLORIDE 150 MG/1
150 TABLET ORAL DAILY
Qty: 30 TABLET | Refills: 1 | Status: SHIPPED | OUTPATIENT
Start: 2021-02-14 | End: 2021-08-16

## 2021-03-01 ENCOUNTER — APPOINTMENT (OUTPATIENT)
Dept: CT IMAGING | Age: 21
End: 2021-03-01
Attending: EMERGENCY MEDICINE
Payer: COMMERCIAL

## 2021-03-01 ENCOUNTER — APPOINTMENT (OUTPATIENT)
Dept: ULTRASOUND IMAGING | Age: 21
End: 2021-03-01
Attending: PHYSICIAN ASSISTANT
Payer: COMMERCIAL

## 2021-03-01 ENCOUNTER — HOSPITAL ENCOUNTER (EMERGENCY)
Age: 21
Discharge: HOME | End: 2021-03-01
Attending: EMERGENCY MEDICINE
Payer: COMMERCIAL

## 2021-03-01 ENCOUNTER — HOSPITAL ENCOUNTER (OUTPATIENT)
Age: 21
Discharge: ANOTHER HOSPITAL | End: 2021-03-01
Payer: COMMERCIAL

## 2021-03-01 VITALS
SYSTOLIC BLOOD PRESSURE: 141 MMHG | OXYGEN SATURATION: 99 % | DIASTOLIC BLOOD PRESSURE: 101 MMHG | TEMPERATURE: 97 F | HEART RATE: 85 BPM | RESPIRATION RATE: 16 BRPM

## 2021-03-01 VITALS
RESPIRATION RATE: 16 BRPM | TEMPERATURE: 99 F | DIASTOLIC BLOOD PRESSURE: 82 MMHG | HEART RATE: 82 BPM | HEIGHT: 68 IN | WEIGHT: 190 LBS | SYSTOLIC BLOOD PRESSURE: 147 MMHG | OXYGEN SATURATION: 100 % | BODY MASS INDEX: 28.79 KG/M2

## 2021-03-01 DIAGNOSIS — R10.13 EPIGASTRIC PAIN: ICD-10-CM

## 2021-03-01 DIAGNOSIS — K92.0 HEMATEMESIS, PRESENCE OF NAUSEA NOT SPECIFIED: Primary | ICD-10-CM

## 2021-03-01 DIAGNOSIS — R19.5 DARK STOOLS: ICD-10-CM

## 2021-03-01 DIAGNOSIS — R11.2 NAUSEA AND VOMITING IN ADULT: Primary | ICD-10-CM

## 2021-03-01 LAB
ALBUMIN SERPL-MCNC: 4.2 G/DL (ref 3.4–5)
ALBUMIN/GLOB SERPL: 1.4 {RATIO} (ref 1–2)
ALP LIVER SERPL-CCNC: 54 U/L
ALT SERPL-CCNC: 25 U/L
ANION GAP SERPL CALC-SCNC: 5 MMOL/L (ref 0–18)
AST SERPL-CCNC: 13 U/L (ref 15–37)
BASOPHILS # BLD AUTO: 0.03 X10(3) UL (ref 0–0.2)
BASOPHILS NFR BLD AUTO: 0.6 %
BILIRUB SERPL-MCNC: 0.9 MG/DL (ref 0.1–2)
BILIRUB UR QL STRIP.AUTO: NEGATIVE
BUN BLD-MCNC: 8 MG/DL (ref 7–18)
BUN/CREAT SERPL: 10.7 (ref 10–20)
CALCIUM BLD-MCNC: 9.1 MG/DL (ref 8.5–10.1)
CHLORIDE SERPL-SCNC: 105 MMOL/L (ref 98–112)
CLARITY UR REFRACT.AUTO: CLEAR
CO2 SERPL-SCNC: 28 MMOL/L (ref 21–32)
COLOR UR AUTO: YELLOW
CREAT BLD-MCNC: 0.75 MG/DL
DEPRECATED RDW RBC AUTO: 43.9 FL (ref 35.1–46.3)
EOSINOPHIL # BLD AUTO: 0.05 X10(3) UL (ref 0–0.7)
EOSINOPHIL NFR BLD AUTO: 0.9 %
ERYTHROCYTE [DISTWIDTH] IN BLOOD BY AUTOMATED COUNT: 12.8 % (ref 11–15)
GLOBULIN PLAS-MCNC: 3 G/DL (ref 2.8–4.4)
GLUCOSE BLD-MCNC: 88 MG/DL (ref 70–99)
GLUCOSE UR STRIP.AUTO-MCNC: NEGATIVE MG/DL
HCT VFR BLD AUTO: 41.3 %
HGB BLD-MCNC: 14.3 G/DL
IMM GRANULOCYTES # BLD AUTO: 0.01 X10(3) UL (ref 0–1)
IMM GRANULOCYTES NFR BLD: 0.2 %
KETONES UR STRIP.AUTO-MCNC: NEGATIVE MG/DL
LIPASE SERPL-CCNC: 78 U/L (ref 73–393)
LYMPHOCYTES # BLD AUTO: 1.06 X10(3) UL (ref 1–4)
LYMPHOCYTES NFR BLD AUTO: 20 %
M PROTEIN MFR SERPL ELPH: 7.2 G/DL (ref 6.4–8.2)
MCH RBC QN AUTO: 32.2 PG (ref 26–34)
MCHC RBC AUTO-ENTMCNC: 34.6 G/DL (ref 31–37)
MCV RBC AUTO: 93 FL
MONOCYTES # BLD AUTO: 0.46 X10(3) UL (ref 0.1–1)
MONOCYTES NFR BLD AUTO: 8.7 %
NEUTROPHILS # BLD AUTO: 3.69 X10 (3) UL (ref 1.5–7.7)
NEUTROPHILS # BLD AUTO: 3.69 X10(3) UL (ref 1.5–7.7)
NEUTROPHILS NFR BLD AUTO: 69.6 %
NITRITE UR QL STRIP.AUTO: NEGATIVE
OSMOLALITY SERPL CALC.SUM OF ELEC: 284 MOSM/KG (ref 275–295)
PH UR STRIP.AUTO: 8.5 [PH] (ref 5–8)
PLATELET # BLD AUTO: 217 10(3)UL (ref 150–450)
POCT LOT NUMBER: NORMAL
POCT URINE PREGNANCY: NEGATIVE
POTASSIUM SERPL-SCNC: 3.9 MMOL/L (ref 3.5–5.1)
PROT UR STRIP.AUTO-MCNC: NEGATIVE MG/DL
RBC # BLD AUTO: 4.44 X10(6)UL
RBC UR QL AUTO: NEGATIVE
SODIUM SERPL-SCNC: 138 MMOL/L (ref 136–145)
SP GR UR STRIP.AUTO: 1.02 (ref 1–1.03)
UROBILINOGEN UR STRIP.AUTO-MCNC: 1 MG/DL
WBC # BLD AUTO: 5.3 X10(3) UL (ref 4–11)

## 2021-03-01 PROCEDURE — 81025 URINE PREGNANCY TEST: CPT

## 2021-03-01 PROCEDURE — 99284 EMERGENCY DEPT VISIT MOD MDM: CPT

## 2021-03-01 PROCEDURE — 87077 CULTURE AEROBIC IDENTIFY: CPT | Performed by: EMERGENCY MEDICINE

## 2021-03-01 PROCEDURE — 74177 CT ABD & PELVIS W/CONTRAST: CPT | Performed by: EMERGENCY MEDICINE

## 2021-03-01 PROCEDURE — 76700 US EXAM ABDOM COMPLETE: CPT | Performed by: PHYSICIAN ASSISTANT

## 2021-03-01 PROCEDURE — 87086 URINE CULTURE/COLONY COUNT: CPT | Performed by: EMERGENCY MEDICINE

## 2021-03-01 PROCEDURE — 99215 OFFICE O/P EST HI 40 MIN: CPT | Performed by: PHYSICIAN ASSISTANT

## 2021-03-01 PROCEDURE — 80053 COMPREHEN METABOLIC PANEL: CPT | Performed by: EMERGENCY MEDICINE

## 2021-03-01 PROCEDURE — 83690 ASSAY OF LIPASE: CPT | Performed by: EMERGENCY MEDICINE

## 2021-03-01 PROCEDURE — 96361 HYDRATE IV INFUSION ADD-ON: CPT

## 2021-03-01 PROCEDURE — 81001 URINALYSIS AUTO W/SCOPE: CPT | Performed by: EMERGENCY MEDICINE

## 2021-03-01 PROCEDURE — 82272 OCCULT BLD FECES 1-3 TESTS: CPT

## 2021-03-01 PROCEDURE — 85025 COMPLETE CBC W/AUTO DIFF WBC: CPT | Performed by: EMERGENCY MEDICINE

## 2021-03-01 PROCEDURE — 96360 HYDRATION IV INFUSION INIT: CPT

## 2021-03-01 RX ORDER — SODIUM CHLORIDE 9 MG/ML
INJECTION, SOLUTION INTRAVENOUS CONTINUOUS
Status: DISCONTINUED | OUTPATIENT
Start: 2021-03-01 | End: 2021-03-01

## 2021-03-01 RX ORDER — NICOTINE POLACRILEX 4 MG/1
20 GUM, CHEWING ORAL DAILY
Qty: 30 TABLET | Refills: 0 | Status: SHIPPED | OUTPATIENT
Start: 2021-03-01 | End: 2021-03-31

## 2021-03-01 RX ORDER — SUCRALFATE ORAL 1 G/10ML
1 SUSPENSION ORAL
Qty: 400 ML | Refills: 0 | Status: SHIPPED | OUTPATIENT
Start: 2021-03-01 | End: 2021-03-11

## 2021-03-01 NOTE — ED PROVIDER NOTES
Patient Seen in: Immediate [facility]      History   Patient presents with:  Nausea/Vomiting/Diarrhea    Stated Complaint: vomiting/blood in vomit    HPI/Subjective:   HPI    67-year-old female presents to the urgent care concern over vomiting and dark are negative. Positive for stated complaint: vomiting/blood in vomit  Other systems are as noted in HPI. Constitutional and vital signs reviewed. All other systems reviewed and negative except as noted above.     Physical Exam     ED Triage Vital stools. Patient in agreement with going to [address] [place] [address]. Did offer an ambulance which patient stated she did not feel she needed.     At this time given I am sending the patient to the ER I did not do any invasive exams/blood test.      1134 hours spoke

## 2021-03-01 NOTE — ED INITIAL ASSESSMENT (HPI)
Vomiting almost daily for 2 weeks, not having normal bm's, had bright red blood in emesis on Friday, vomiting Saturday but none since, stomach pain today, usually more after eating, sent from [facility], denies cough or fever

## 2021-03-01 NOTE — ED INITIAL ASSESSMENT (HPI)
Pt c/o n/v intermittantly after eating for past 2 weeks, stool has been black intermittantly also, bright red blood noted emesis since friday

## 2021-03-01 NOTE — ED PROVIDER NOTES
Patient Seen in: [facility] [place]      History   Patient presents with:  Abdomen/Flank Pain  Nausea/Vomiting/Diarrhea    Stated Complaint: abdom pain, vomiting, blood on Friday    HPI/Subjective:   HPI    CHIEF COMPLAINT: Epigast systems was obtained and other than the HPI was negative     The patient's medication list, past medical history and social history elements is as listed in today's nurse's notes are reviewed and agree.  The patient's family history is reviewed and is nonco auscultation  Cardiovascular: regular rate and rhythm  Gastrointestinal:  abdomen is soft and diffusely tender, no masses, bowel sounds normal.  No rebound, guarding or rigidity noted. No abdominal distention. No pulsatile masses. Mccartney sign negative. RAINBOW DRAW LIGHT GREEN   RAINBOW DRAW GOLD   URINE CULTURE, ROUTINE   CBC W/ DIFFERENTIAL          Us Abdomen Complete (cpt=76700)    Result Date: 3/1/2021  PROCEDURE:  US ABDOMEN COMPLETE (CPT=76700)  COMPARISON:  None.   INDICATIONS:  abdom pain, vomi vomiting, blood on Friday  TECHNIQUE:  CT scanning was performed from the dome of the diaphragm to the pubic symphysis with non-ionic intravenous contrast material. Post contrast coronal MPR imaging was performed. Dose reduction techniques were used.  Dose follicle incidentally noted measuring 2.7 cm in maximal dimension. No free fluid or inflammatory changes of the pelvis.    Dictated by (CST): Khadar Ba DO on 3/01/2021 at 2:53 PM     Finalized by (CST): Khadar Ba DO on 3/01/2021 at 3:00 PM

## 2021-03-03 ENCOUNTER — PATIENT MESSAGE (OUTPATIENT)
Dept: FAMILY MEDICINE CLINIC | Facility: CLINIC | Age: 21
End: 2021-03-03

## 2021-03-05 RX ORDER — AMPICILLIN 500 MG/1
500 CAPSULE ORAL 4 TIMES DAILY
Qty: 20 CAPSULE | Refills: 0 | Status: SHIPPED | OUTPATIENT
Start: 2021-03-05 | End: 2021-03-10

## 2021-04-13 ENCOUNTER — OFFICE VISIT (OUTPATIENT)
Dept: FAMILY MEDICINE CLINIC | Facility: CLINIC | Age: 21
End: 2021-04-13
Payer: COMMERCIAL

## 2021-04-13 VITALS
WEIGHT: 197 LBS | OXYGEN SATURATION: 98 % | BODY MASS INDEX: 30 KG/M2 | TEMPERATURE: 98 F | HEART RATE: 93 BPM | SYSTOLIC BLOOD PRESSURE: 110 MMHG | DIASTOLIC BLOOD PRESSURE: 82 MMHG

## 2021-04-13 DIAGNOSIS — R30.0 DYSURIA: Primary | ICD-10-CM

## 2021-04-13 DIAGNOSIS — N89.8 VAGINAL DISCHARGE: ICD-10-CM

## 2021-04-13 DIAGNOSIS — K29.51 CHRONIC GASTRITIS WITH BLEEDING, UNSPECIFIED GASTRITIS TYPE: ICD-10-CM

## 2021-04-13 PROCEDURE — 87591 N.GONORRHOEAE DNA AMP PROB: CPT | Performed by: FAMILY MEDICINE

## 2021-04-13 PROCEDURE — 99214 OFFICE O/P EST MOD 30 MIN: CPT | Performed by: FAMILY MEDICINE

## 2021-04-13 PROCEDURE — 3074F SYST BP LT 130 MM HG: CPT | Performed by: FAMILY MEDICINE

## 2021-04-13 PROCEDURE — 87480 CANDIDA DNA DIR PROBE: CPT | Performed by: FAMILY MEDICINE

## 2021-04-13 PROCEDURE — 87491 CHLMYD TRACH DNA AMP PROBE: CPT | Performed by: FAMILY MEDICINE

## 2021-04-13 PROCEDURE — 87510 GARDNER VAG DNA DIR PROBE: CPT | Performed by: FAMILY MEDICINE

## 2021-04-13 PROCEDURE — 87086 URINE CULTURE/COLONY COUNT: CPT | Performed by: FAMILY MEDICINE

## 2021-04-13 PROCEDURE — 87660 TRICHOMONAS VAGIN DIR PROBE: CPT | Performed by: FAMILY MEDICINE

## 2021-04-13 PROCEDURE — 3079F DIAST BP 80-89 MM HG: CPT | Performed by: FAMILY MEDICINE

## 2021-04-13 PROCEDURE — 81003 URINALYSIS AUTO W/O SCOPE: CPT | Performed by: FAMILY MEDICINE

## 2021-04-13 NOTE — PROGRESS NOTES
Joe Dejesus is a 24year old female. HPI:   Patient c/o mild uriary symptoms for a week, very mild burning when urine comes out, very mild vaginal itching.   Doesn't feel as intense as her usual UTI, feels more reminiscent of when she had chlamyd HPI    EXAM:   /82   Pulse 93   Temp 97.7 °F (36.5 °C) (Temporal)   Wt 197 lb (89.4 kg)   LMP 03/23/2021   SpO2 98%   BMI 29.95 kg/m²   GENERAL: well developed, well nourished,in no apparent distress  SKIN: no rashes,no suspicious lesions  HEENT: atr

## 2021-04-25 ENCOUNTER — HOSPITAL ENCOUNTER (OUTPATIENT)
Age: 21
Discharge: HOME | End: 2021-04-25
Payer: COMMERCIAL

## 2021-04-25 VITALS
TEMPERATURE: 98 F | OXYGEN SATURATION: 98 % | SYSTOLIC BLOOD PRESSURE: 110 MMHG | HEART RATE: 70 BPM | RESPIRATION RATE: 16 BRPM | DIASTOLIC BLOOD PRESSURE: 80 MMHG

## 2021-04-25 DIAGNOSIS — J04.0 ACUTE LARYNGITIS: Primary | ICD-10-CM

## 2021-04-25 DIAGNOSIS — Z20.822 LAB TEST NEGATIVE FOR COVID-19 VIRUS: ICD-10-CM

## 2021-04-25 DIAGNOSIS — Z11.52 ENCOUNTER FOR SCREENING FOR COVID-19: ICD-10-CM

## 2021-04-25 PROCEDURE — 99213 OFFICE O/P EST LOW 20 MIN: CPT | Performed by: PHYSICIAN ASSISTANT

## 2021-04-25 PROCEDURE — U0002 COVID-19 LAB TEST NON-CDC: HCPCS | Performed by: PHYSICIAN ASSISTANT

## 2021-04-25 NOTE — ED INITIAL ASSESSMENT (HPI)
Pt with c/o cough, sore throat and congestion. Pt ongoing for the past few weeks. Pt thought was allergies. Pt with hoarse voice.   Needs testing for work

## 2021-04-25 NOTE — ED PROVIDER NOTES
Patient Seen in: Immediate [facility]      History   Patient presents with:  Testing    Stated Complaint: Covid Test- Cough, Sore Throat    HPI/Subjective:   HPI    76-year-old female presents to the immediate care for Covid testing.   Patient reports 4 Cardiovascular:      Rate and Rhythm: Normal rate and regular rhythm. Pulmonary:      Effort: Pulmonary effort is normal.      Breath sounds: Normal breath sounds. Musculoskeletal:      Cervical back: Normal range of motion and neck supple.    Skin:

## 2021-05-05 ENCOUNTER — OFFICE VISIT (OUTPATIENT)
Dept: FAMILY MEDICINE CLINIC | Facility: CLINIC | Age: 21
End: 2021-05-05
Payer: COMMERCIAL

## 2021-05-05 VITALS
OXYGEN SATURATION: 99 % | SYSTOLIC BLOOD PRESSURE: 118 MMHG | HEIGHT: 68 IN | DIASTOLIC BLOOD PRESSURE: 86 MMHG | WEIGHT: 195.19 LBS | BODY MASS INDEX: 29.58 KG/M2 | HEART RATE: 79 BPM | TEMPERATURE: 98 F

## 2021-05-05 DIAGNOSIS — N89.8 VAGINAL ITCHING: Primary | ICD-10-CM

## 2021-05-05 DIAGNOSIS — N89.8 VAGINAL DISCHARGE: ICD-10-CM

## 2021-05-05 PROCEDURE — 87660 TRICHOMONAS VAGIN DIR PROBE: CPT | Performed by: FAMILY MEDICINE

## 2021-05-05 PROCEDURE — 3008F BODY MASS INDEX DOCD: CPT | Performed by: FAMILY MEDICINE

## 2021-05-05 PROCEDURE — 3074F SYST BP LT 130 MM HG: CPT | Performed by: FAMILY MEDICINE

## 2021-05-05 PROCEDURE — 87591 N.GONORRHOEAE DNA AMP PROB: CPT | Performed by: FAMILY MEDICINE

## 2021-05-05 PROCEDURE — 99213 OFFICE O/P EST LOW 20 MIN: CPT | Performed by: FAMILY MEDICINE

## 2021-05-05 PROCEDURE — 87491 CHLMYD TRACH DNA AMP PROBE: CPT | Performed by: FAMILY MEDICINE

## 2021-05-05 PROCEDURE — 87480 CANDIDA DNA DIR PROBE: CPT | Performed by: FAMILY MEDICINE

## 2021-05-05 PROCEDURE — 87510 GARDNER VAG DNA DIR PROBE: CPT | Performed by: FAMILY MEDICINE

## 2021-05-05 PROCEDURE — 3079F DIAST BP 80-89 MM HG: CPT | Performed by: FAMILY MEDICINE

## 2021-05-05 RX ORDER — FLUCONAZOLE 150 MG/1
150 TABLET ORAL ONCE
Qty: 2 TABLET | Refills: 1 | Status: SHIPPED | OUTPATIENT
Start: 2021-05-05 | End: 2021-05-05
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
## 2021-06-24 ENCOUNTER — HOSPITAL ENCOUNTER (OUTPATIENT)
Age: 21
Discharge: HOME | End: 2021-06-24
Payer: COMMERCIAL

## 2021-06-24 VITALS
OXYGEN SATURATION: 99 % | HEART RATE: 90 BPM | RESPIRATION RATE: 14 BRPM | TEMPERATURE: 98 F | SYSTOLIC BLOOD PRESSURE: 140 MMHG | DIASTOLIC BLOOD PRESSURE: 85 MMHG

## 2021-06-24 DIAGNOSIS — H10.32 ACUTE CONJUNCTIVITIS OF LEFT EYE, UNSPECIFIED ACUTE CONJUNCTIVITIS TYPE: Primary | ICD-10-CM

## 2021-06-24 PROCEDURE — 99213 OFFICE O/P EST LOW 20 MIN: CPT | Performed by: NURSE PRACTITIONER

## 2021-06-24 RX ORDER — POLYMYXIN B SULFATE AND TRIMETHOPRIM 1; 10000 MG/ML; [USP'U]/ML
1 SOLUTION OPHTHALMIC
Qty: 10 ML | Refills: 0 | Status: SHIPPED | OUTPATIENT
Start: 2021-06-24 | End: 2021-06-29

## 2021-06-24 NOTE — ED INITIAL ASSESSMENT (HPI)
Patient woke up with left eye crust and is having some foggy vision out of that eye. Patient/Caregiver provided printed discharge information.

## 2021-06-24 NOTE — ED PROVIDER NOTES
Patient Seen in: Immediate [facility]      History   Patient presents with:  Conjunctivitis    Stated Complaint: eyes foggy  red goopy    HPI/Subjective:   19-year-old female presents today with redness irritation to the left eye.   States awoke this mor Mouth/Throat:      Mouth: Mucous membranes are moist.   Eyes:      General: Lids are normal. Lids are everted, no foreign bodies appreciated. Left eye: No foreign body. Conjunctiva/sclera:      Left eye: Left conjunctiva is injected.       Pup

## 2021-07-06 ENCOUNTER — OFFICE VISIT (OUTPATIENT)
Dept: FAMILY MEDICINE CLINIC | Facility: CLINIC | Age: 21
End: 2021-07-06
Payer: COMMERCIAL

## 2021-07-06 VITALS
OXYGEN SATURATION: 99 % | BODY MASS INDEX: 30 KG/M2 | TEMPERATURE: 97 F | SYSTOLIC BLOOD PRESSURE: 124 MMHG | DIASTOLIC BLOOD PRESSURE: 82 MMHG | WEIGHT: 196.38 LBS | HEART RATE: 86 BPM

## 2021-07-06 DIAGNOSIS — R10.2 PELVIC PAIN: ICD-10-CM

## 2021-07-06 DIAGNOSIS — N89.8 VAGINAL IRRITATION: Primary | ICD-10-CM

## 2021-07-06 DIAGNOSIS — R35.0 URINARY FREQUENCY: ICD-10-CM

## 2021-07-06 LAB
BILIRUB UR QL STRIP.AUTO: NEGATIVE
COLOR UR AUTO: YELLOW
GLUCOSE UR STRIP.AUTO-MCNC: NEGATIVE MG/DL
KETONES UR STRIP.AUTO-MCNC: NEGATIVE MG/DL
LEUKOCYTE ESTERASE UR QL STRIP.AUTO: NEGATIVE
NITRITE UR QL STRIP.AUTO: NEGATIVE
PH UR STRIP.AUTO: 6 [PH] (ref 5–8)
PROT UR STRIP.AUTO-MCNC: NEGATIVE MG/DL
RBC UR QL AUTO: NEGATIVE
SP GR UR STRIP.AUTO: 1.02 (ref 1–1.03)
UROBILINOGEN UR STRIP.AUTO-MCNC: 4 MG/DL

## 2021-07-06 PROCEDURE — 87480 CANDIDA DNA DIR PROBE: CPT | Performed by: FAMILY MEDICINE

## 2021-07-06 PROCEDURE — 87660 TRICHOMONAS VAGIN DIR PROBE: CPT | Performed by: FAMILY MEDICINE

## 2021-07-06 PROCEDURE — 3074F SYST BP LT 130 MM HG: CPT | Performed by: FAMILY MEDICINE

## 2021-07-06 PROCEDURE — 87086 URINE CULTURE/COLONY COUNT: CPT | Performed by: FAMILY MEDICINE

## 2021-07-06 PROCEDURE — 99214 OFFICE O/P EST MOD 30 MIN: CPT | Performed by: FAMILY MEDICINE

## 2021-07-06 PROCEDURE — 87591 N.GONORRHOEAE DNA AMP PROB: CPT | Performed by: FAMILY MEDICINE

## 2021-07-06 PROCEDURE — 87491 CHLMYD TRACH DNA AMP PROBE: CPT | Performed by: FAMILY MEDICINE

## 2021-07-06 PROCEDURE — 87510 GARDNER VAG DNA DIR PROBE: CPT | Performed by: FAMILY MEDICINE

## 2021-07-06 PROCEDURE — 81003 URINALYSIS AUTO W/O SCOPE: CPT | Performed by: FAMILY MEDICINE

## 2021-07-06 PROCEDURE — 3079F DIAST BP 80-89 MM HG: CPT | Performed by: FAMILY MEDICINE

## 2021-07-06 NOTE — PROGRESS NOTES
Rocky Gr is a 24year old female. HPI:   Patient c/o 1 month of vaginal irritation, lower pelvic discomfort, urinary frequency, mild intermittent vaginal discharge but not terrible. No fevers/chills.   Had a few days of weird nausea/vomiting noted  EXTREMITIES: no cyanosis, clubbing or edema    ASSESSMENT AND PLAN:   Diagnoses and all orders for this visit:    Vaginal irritation  -     CHLAMYDIA/GONOCOCCUS, JHONY;  Future  -     VAGINITIS/VAGINOSIS, DNA PROBE; Future  -     URINALYSIS, ROUTINE; F

## 2021-07-07 LAB
C TRACH DNA SPEC QL NAA+PROBE: NEGATIVE
N GONORRHOEA DNA SPEC QL NAA+PROBE: NEGATIVE

## 2021-08-12 ENCOUNTER — TELEPHONE (OUTPATIENT)
Dept: FAMILY MEDICINE CLINIC | Facility: CLINIC | Age: 21
End: 2021-08-12

## 2021-08-12 NOTE — TELEPHONE ENCOUNTER
Patient called requesting a call from nurse.     RE: Possibly UTI    Please call back # [phone]

## 2021-08-12 NOTE — TELEPHONE ENCOUNTER
Pt states she thinks she has a bad UTI    Pt states SXS she is having burning and urgency    Pt states she did take AZO- RN advised that would inhibit an in office test but we can still send off for culture    Pt advised she will just go to [facility]

## 2021-08-16 ENCOUNTER — OFFICE VISIT (OUTPATIENT)
Dept: FAMILY MEDICINE CLINIC | Facility: CLINIC | Age: 21
End: 2021-08-16
Payer: COMMERCIAL

## 2021-08-16 VITALS
BODY MASS INDEX: 30 KG/M2 | SYSTOLIC BLOOD PRESSURE: 120 MMHG | OXYGEN SATURATION: 99 % | HEART RATE: 80 BPM | DIASTOLIC BLOOD PRESSURE: 84 MMHG | TEMPERATURE: 98 F | WEIGHT: 197 LBS

## 2021-08-16 DIAGNOSIS — R30.0 DYSURIA: Primary | ICD-10-CM

## 2021-08-16 DIAGNOSIS — N89.8 VAGINAL IRRITATION: ICD-10-CM

## 2021-08-16 DIAGNOSIS — R35.0 URINARY FREQUENCY: ICD-10-CM

## 2021-08-16 DIAGNOSIS — Z11.3 ROUTINE SCREENING FOR STI (SEXUALLY TRANSMITTED INFECTION): ICD-10-CM

## 2021-08-16 LAB
APPEARANCE: CLEAR
BILIRUBIN: NEGATIVE
GLUCOSE (URINE DIPSTICK): NEGATIVE MG/DL
KETONES (URINE DIPSTICK): NEGATIVE MG/DL
MULTISTIX LOT#: 5077 NUMERIC
NITRITE, URINE: NEGATIVE
PH, URINE: 7 (ref 4.5–8)
PROTEIN (URINE DIPSTICK): NEGATIVE MG/DL
SPECIFIC GRAVITY: 1.02 (ref 1–1.03)
URINE-COLOR: YELLOW
UROBILINOGEN,SEMI-QN: 0.2 MG/DL (ref 0–1.9)

## 2021-08-16 PROCEDURE — 87086 URINE CULTURE/COLONY COUNT: CPT | Performed by: FAMILY MEDICINE

## 2021-08-16 PROCEDURE — 87491 CHLMYD TRACH DNA AMP PROBE: CPT | Performed by: FAMILY MEDICINE

## 2021-08-16 PROCEDURE — 87510 GARDNER VAG DNA DIR PROBE: CPT | Performed by: FAMILY MEDICINE

## 2021-08-16 PROCEDURE — 87088 URINE BACTERIA CULTURE: CPT | Performed by: FAMILY MEDICINE

## 2021-08-16 PROCEDURE — 81003 URINALYSIS AUTO W/O SCOPE: CPT | Performed by: FAMILY MEDICINE

## 2021-08-16 PROCEDURE — 87480 CANDIDA DNA DIR PROBE: CPT | Performed by: FAMILY MEDICINE

## 2021-08-16 PROCEDURE — 3074F SYST BP LT 130 MM HG: CPT | Performed by: FAMILY MEDICINE

## 2021-08-16 PROCEDURE — 87186 SC STD MICRODIL/AGAR DIL: CPT | Performed by: FAMILY MEDICINE

## 2021-08-16 PROCEDURE — 99214 OFFICE O/P EST MOD 30 MIN: CPT | Performed by: FAMILY MEDICINE

## 2021-08-16 PROCEDURE — 3079F DIAST BP 80-89 MM HG: CPT | Performed by: FAMILY MEDICINE

## 2021-08-16 PROCEDURE — 87591 N.GONORRHOEAE DNA AMP PROB: CPT | Performed by: FAMILY MEDICINE

## 2021-08-16 PROCEDURE — 87660 TRICHOMONAS VAGIN DIR PROBE: CPT | Performed by: FAMILY MEDICINE

## 2021-08-16 RX ORDER — AZITHROMYCIN 500 MG/1
1000 TABLET, FILM COATED ORAL DAILY
Qty: 2 TABLET | Refills: 0 | Status: SHIPPED | OUTPATIENT
Start: 2021-08-16 | End: 2021-08-17

## 2021-08-16 NOTE — PROGRESS NOTES
Thaddeus Flaherty is a 24year old female.   HPI:   About 2 weeks ago developed urinary frequency, slight burning, started azo, then developed vaginal irritation, then got her period, period stopped 2 days ago, then when she wiped yesterday she had bloo LMP 08/08/2021   SpO2 99%   BMI 29.95 kg/m²   GENERAL: well developed, well nourished,in no apparent distress  SKIN: no rashes,no suspicious lesions of perineum  LUNGS: normal resp effort  CARDIO: well perfsued  : in area of chronic vaginal irritation is

## 2021-08-17 ENCOUNTER — PATIENT MESSAGE (OUTPATIENT)
Dept: FAMILY MEDICINE CLINIC | Facility: CLINIC | Age: 21
End: 2021-08-17

## 2021-08-17 RX ORDER — METRONIDAZOLE 500 MG/1
500 TABLET ORAL 2 TIMES DAILY
Qty: 14 TABLET | Refills: 0 | Status: SHIPPED | OUTPATIENT
Start: 2021-08-17 | End: 2021-08-24

## 2021-08-17 NOTE — TELEPHONE ENCOUNTER
From: Efrain Reyes  To: Gussie Holstein, MD  Sent: 8/17/2021 8:29 AM CDT  Subject: Test Results Question    Id prefer the oral and if you could send it to the [facility] on [address] [facility] in [place], that would be great! Thank you.

## 2021-08-18 LAB
C TRACH DNA SPEC QL NAA+PROBE: NEGATIVE
N GONORRHOEA DNA SPEC QL NAA+PROBE: NEGATIVE

## 2021-08-19 RX ORDER — NITROFURANTOIN 25; 75 MG/1; MG/1
100 CAPSULE ORAL 2 TIMES DAILY
Qty: 14 CAPSULE | Refills: 0 | Status: SHIPPED | OUTPATIENT
Start: 2021-08-19 | End: 2021-08-26

## 2021-10-19 ENCOUNTER — OFFICE VISIT (OUTPATIENT)
Dept: FAMILY MEDICINE CLINIC | Facility: CLINIC | Age: 21
End: 2021-10-19
Payer: COMMERCIAL

## 2021-10-19 VITALS
SYSTOLIC BLOOD PRESSURE: 100 MMHG | RESPIRATION RATE: 18 BRPM | TEMPERATURE: 98 F | HEART RATE: 76 BPM | DIASTOLIC BLOOD PRESSURE: 80 MMHG | OXYGEN SATURATION: 100 % | WEIGHT: 186.81 LBS | BODY MASS INDEX: 28 KG/M2

## 2021-10-19 DIAGNOSIS — N89.8 VAGINAL DISCHARGE: ICD-10-CM

## 2021-10-19 DIAGNOSIS — R35.0 URINARY FREQUENCY: Primary | ICD-10-CM

## 2021-10-19 PROCEDURE — 81003 URINALYSIS AUTO W/O SCOPE: CPT | Performed by: NURSE PRACTITIONER

## 2021-10-19 PROCEDURE — 3079F DIAST BP 80-89 MM HG: CPT | Performed by: NURSE PRACTITIONER

## 2021-10-19 PROCEDURE — 87510 GARDNER VAG DNA DIR PROBE: CPT | Performed by: NURSE PRACTITIONER

## 2021-10-19 PROCEDURE — 87660 TRICHOMONAS VAGIN DIR PROBE: CPT | Performed by: NURSE PRACTITIONER

## 2021-10-19 PROCEDURE — 99213 OFFICE O/P EST LOW 20 MIN: CPT | Performed by: NURSE PRACTITIONER

## 2021-10-19 PROCEDURE — 87591 N.GONORRHOEAE DNA AMP PROB: CPT | Performed by: NURSE PRACTITIONER

## 2021-10-19 PROCEDURE — 87491 CHLMYD TRACH DNA AMP PROBE: CPT | Performed by: NURSE PRACTITIONER

## 2021-10-19 PROCEDURE — 3074F SYST BP LT 130 MM HG: CPT | Performed by: NURSE PRACTITIONER

## 2021-10-19 PROCEDURE — 87480 CANDIDA DNA DIR PROBE: CPT | Performed by: NURSE PRACTITIONER

## 2021-10-19 PROCEDURE — 87086 URINE CULTURE/COLONY COUNT: CPT | Performed by: NURSE PRACTITIONER

## 2021-10-19 PROCEDURE — 87147 CULTURE TYPE IMMUNOLOGIC: CPT | Performed by: NURSE PRACTITIONER

## 2021-10-19 NOTE — PROGRESS NOTES
Subjective:   Patient ID: David Rojas is a 24year old female. Patient presents to the clinic today for evaluation of possible UTI. States she is having urinary frequency along with burning sensation.   Patient does have history of frequent BV This Encounter      Urine Dip, auto without Micro      Urine Culture, Routine [E]      Vaginitis/Vaginosis, Dna Probe      Chlamydia/Gc Amplification [E]    Patient was seen in office today for evaluation of UTI/BV symptoms? Urine culture obtained today.

## 2021-10-19 NOTE — PATIENT INSTRUCTIONS
Continue to monitor symptoms. No intercourse or submerging in water until symptoms completely resolved. Vaginal swab today. Urine culture today. We treat based on labs.   Please follow-up with the office should symptoms persist or worsen before we reach
no

## 2021-10-21 ENCOUNTER — PATIENT MESSAGE (OUTPATIENT)
Dept: FAMILY MEDICINE CLINIC | Facility: CLINIC | Age: 21
End: 2021-10-21

## 2021-10-21 RX ORDER — FLUCONAZOLE 150 MG/1
150 TABLET ORAL
Qty: 2 TABLET | Refills: 0 | Status: SHIPPED | OUTPATIENT
Start: 2021-10-21

## 2021-10-21 NOTE — TELEPHONE ENCOUNTER
Spoke with patient via phone conversation. Patient continues to have uncomfortable feeling in vaginal area. States she is having some discharge.   Vaginal swab is negative for BV or yeast.  We will treat with Diflucan at this point, waiting on final urine

## 2021-10-21 NOTE — TELEPHONE ENCOUNTER
From: Saira Payment  To: COLLIN Rodrigues  Sent: 10/21/2021 9:31 AM CDT  Subject: Test Results    Hi Odolina! I’m still having the same symptoms unfortunately. .do you have any ideas of what I could do to improve them?

## 2021-12-20 ENCOUNTER — OFFICE VISIT (OUTPATIENT)
Dept: FAMILY MEDICINE CLINIC | Facility: CLINIC | Age: 21
End: 2021-12-20
Payer: COMMERCIAL

## 2021-12-20 VITALS
TEMPERATURE: 98 F | WEIGHT: 195.19 LBS | SYSTOLIC BLOOD PRESSURE: 110 MMHG | DIASTOLIC BLOOD PRESSURE: 76 MMHG | BODY MASS INDEX: 30 KG/M2

## 2021-12-20 DIAGNOSIS — N89.8 VAGINAL DISCHARGE: Primary | ICD-10-CM

## 2021-12-20 DIAGNOSIS — N89.8 VAGINAL ODOR: ICD-10-CM

## 2021-12-20 PROCEDURE — 87510 GARDNER VAG DNA DIR PROBE: CPT | Performed by: NURSE PRACTITIONER

## 2021-12-20 PROCEDURE — 3074F SYST BP LT 130 MM HG: CPT | Performed by: NURSE PRACTITIONER

## 2021-12-20 PROCEDURE — 87480 CANDIDA DNA DIR PROBE: CPT | Performed by: NURSE PRACTITIONER

## 2021-12-20 PROCEDURE — 87491 CHLMYD TRACH DNA AMP PROBE: CPT | Performed by: NURSE PRACTITIONER

## 2021-12-20 PROCEDURE — 87660 TRICHOMONAS VAGIN DIR PROBE: CPT | Performed by: NURSE PRACTITIONER

## 2021-12-20 PROCEDURE — 99213 OFFICE O/P EST LOW 20 MIN: CPT | Performed by: NURSE PRACTITIONER

## 2021-12-20 PROCEDURE — 87591 N.GONORRHOEAE DNA AMP PROB: CPT | Performed by: NURSE PRACTITIONER

## 2021-12-20 PROCEDURE — 3078F DIAST BP <80 MM HG: CPT | Performed by: NURSE PRACTITIONER

## 2021-12-20 NOTE — PROGRESS NOTES
Subjective:   Patient ID: Claudy Bowman is a 24year old female. Patient presents to the clinic today for evaluation of abnormal vaginal symptoms.    Symptoms for about 3 weeks  Vaginal odor  No abnormal vaginal discharge- seems to be the normal. out yeast, bv and gc chlamydia. tx based on results. We will follow up with results. She is asked to contact the office as needed with any concerns. She is agreeable to this plan and verbalized understanding.    Meds This Visit:  Requested Prescriptions

## 2021-12-21 RX ORDER — METRONIDAZOLE 500 MG/1
500 TABLET ORAL 2 TIMES DAILY
Qty: 14 TABLET | Refills: 0 | Status: SHIPPED | OUTPATIENT
Start: 2021-12-21 | End: 2021-12-28

## 2022-03-08 ENCOUNTER — PATIENT MESSAGE (OUTPATIENT)
Dept: FAMILY MEDICINE CLINIC | Facility: CLINIC | Age: 22
End: 2022-03-08

## 2022-03-08 RX ORDER — METRONIDAZOLE 7.5 MG/G
1 GEL VAGINAL NIGHTLY
Qty: 70 G | Refills: 0 | Status: SHIPPED | OUTPATIENT
Start: 2022-03-08 | End: 2022-03-13

## 2022-03-30 ENCOUNTER — TELEPHONE (OUTPATIENT)
Dept: FAMILY MEDICINE CLINIC | Facility: CLINIC | Age: 22
End: 2022-03-30

## 2022-03-30 NOTE — TELEPHONE ENCOUNTER
Pt reports Positive preg test last night    Has [facility] appt May 2 - when pt will at 8 weeks pregnancy    Pt reports Cramping started yesterday  Noticed light spotting today    Pt very concerned - asking if needs to go to ER    Advised pt that Dr. Aston Lundberg not in office today, and that covering providers booked today    Advised pt to go to [address] [place] [address] for evaluation due to her concerns. Advised pt to call office back for follow-up if needed - she v/u.     No further questions at this time    Routing to PCP as FYI  (was Dr. Lilly Juarez pt, [address] [place] [address] 12/20/2021 w/ APRN, has NOT seen Dr. Aston Lundberg in office)

## 2022-03-30 NOTE — TELEPHONE ENCOUNTER
Agreed! She needs to be seen in the IC/ER immediately to rule out the possibility of ectopic / early miscarriage. If patient is aware of her blood type and she is Rh negative she needs to be seen in the ER.

## 2022-04-21 ENCOUNTER — OFFICE VISIT (OUTPATIENT)
Dept: FAMILY MEDICINE CLINIC | Facility: CLINIC | Age: 22
End: 2022-04-21
Payer: COMMERCIAL

## 2022-04-21 ENCOUNTER — TELEPHONE (OUTPATIENT)
Dept: FAMILY MEDICINE CLINIC | Facility: CLINIC | Age: 22
End: 2022-04-21

## 2022-04-21 VITALS
SYSTOLIC BLOOD PRESSURE: 122 MMHG | HEIGHT: 68 IN | WEIGHT: 177.13 LBS | HEART RATE: 83 BPM | OXYGEN SATURATION: 99 % | RESPIRATION RATE: 18 BRPM | TEMPERATURE: 98 F | BODY MASS INDEX: 26.84 KG/M2 | DIASTOLIC BLOOD PRESSURE: 78 MMHG

## 2022-04-21 DIAGNOSIS — J04.0 LARYNGITIS: Primary | ICD-10-CM

## 2022-04-21 DIAGNOSIS — Z34.90 EARLY STAGE OF PREGNANCY: ICD-10-CM

## 2022-04-21 DIAGNOSIS — R09.82 POST-NASAL DRIP: ICD-10-CM

## 2022-04-21 DIAGNOSIS — R09.81 SINUS CONGESTION: ICD-10-CM

## 2022-04-21 DIAGNOSIS — R07.89 CHEST TIGHTNESS: ICD-10-CM

## 2022-04-21 PROCEDURE — 3074F SYST BP LT 130 MM HG: CPT | Performed by: FAMILY MEDICINE

## 2022-04-21 PROCEDURE — 3078F DIAST BP <80 MM HG: CPT | Performed by: FAMILY MEDICINE

## 2022-04-21 PROCEDURE — 3008F BODY MASS INDEX DOCD: CPT | Performed by: FAMILY MEDICINE

## 2022-04-21 PROCEDURE — 99213 OFFICE O/P EST LOW 20 MIN: CPT | Performed by: FAMILY MEDICINE

## 2022-04-21 RX ORDER — ALBUTEROL SULFATE 90 UG/1
2 AEROSOL, METERED RESPIRATORY (INHALATION) EVERY 8 HOURS PRN
Qty: 18 G | Refills: 0 | Status: SHIPPED | OUTPATIENT
Start: 2022-04-21 | End: 2022-05-21

## 2022-04-21 RX ORDER — PROMETHAZINE HYDROCHLORIDE 12.5 MG/1
TABLET ORAL
COMMUNITY
Start: 2022-04-18

## 2022-04-21 RX ORDER — AMOXICILLIN 875 MG/1
875 TABLET, COATED ORAL 2 TIMES DAILY
Qty: 20 TABLET | Refills: 0 | Status: SHIPPED | OUTPATIENT
Start: 2022-04-21 | End: 2022-05-01

## 2022-04-21 NOTE — TELEPHONE ENCOUNTER
Future Appointments   Date Time Provider Alisa Urrutia   4/21/2022 10:40 AM Kevin Villeda MD [facility] [place] [facility]

## 2022-04-21 NOTE — TELEPHONE ENCOUNTER
Pt called she thinks that she might have a sinus infection. She said it's been going on for about 1 week. She took at home covid test and it was negative. She is pregnant and did call her OB. They advised that she can take tylenol cold which she has been doing but it is not helping.  Pt wants to know if she can come see Dr. Violetta Baez or what she recommends to take

## 2022-10-18 ENCOUNTER — HOSPITAL ENCOUNTER (OUTPATIENT)
Age: 22
Discharge: HOME | End: 2022-10-18
Payer: COMMERCIAL

## 2022-10-18 ENCOUNTER — TELEPHONE (OUTPATIENT)
Dept: FAMILY MEDICINE CLINIC | Facility: CLINIC | Age: 22
End: 2022-10-18

## 2022-10-18 VITALS
HEART RATE: 79 BPM | OXYGEN SATURATION: 98 % | RESPIRATION RATE: 18 BRPM | SYSTOLIC BLOOD PRESSURE: 122 MMHG | DIASTOLIC BLOOD PRESSURE: 90 MMHG | TEMPERATURE: 97 F

## 2022-10-18 DIAGNOSIS — J01.40 ACUTE PANSINUSITIS, RECURRENCE NOT SPECIFIED: Primary | ICD-10-CM

## 2022-10-18 LAB — SARS-COV-2 RNA RESP QL NAA+PROBE: NOT DETECTED

## 2022-10-18 PROCEDURE — 99213 OFFICE O/P EST LOW 20 MIN: CPT | Performed by: NURSE PRACTITIONER

## 2022-10-18 PROCEDURE — U0002 COVID-19 LAB TEST NON-CDC: HCPCS | Performed by: NURSE PRACTITIONER

## 2022-10-18 RX ORDER — ASPIRIN 81 MG/1
81 TABLET ORAL DAILY
COMMUNITY

## 2022-10-18 RX ORDER — CEFDINIR 300 MG/1
300 CAPSULE ORAL 2 TIMES DAILY
Qty: 20 CAPSULE | Refills: 0 | Status: SHIPPED | OUTPATIENT
Start: 2022-10-18 | End: 2022-10-28

## 2022-10-18 RX ORDER — PRENATAL VIT 27,CALC/IRON/FA 60 MG-1 MG
TABLET ORAL
COMMUNITY

## 2022-10-18 NOTE — TELEPHONE ENCOUNTER
Patient is 33 weeks pregnant    She started with cold symptoms last week (stuffy nose, headache)    This week, symptoms continue with added face, teeth, and head pain    No fevers  No Covid test done    She believes it has turned into a sinus infection    She is due for her 33 week appt and labs with her ob/gyn this week, but they will not see her until she is evaluated and treated    Please adv    Thank you

## 2022-10-18 NOTE — ED INITIAL ASSESSMENT (HPI)
Congested for months,,pt is pregnant. Past week very tired, since last Friday worsening congestion. This am teeth and face hurt. States it feels like a sinus infection.

## 2022-10-18 NOTE — TELEPHONE ENCOUNTER
Per verbal by DS - she needs to go to [facility] and be evaluated and have covid swabbed.     Pt was advised and verbalized understanding pt states she will go to [facility]

## 2023-01-12 ENCOUNTER — OFFICE VISIT (OUTPATIENT)
Dept: FAMILY MEDICINE CLINIC | Facility: CLINIC | Age: 23
End: 2023-01-12
Payer: COMMERCIAL

## 2023-01-12 VITALS
OXYGEN SATURATION: 100 % | BODY MASS INDEX: 31.7 KG/M2 | HEIGHT: 69 IN | DIASTOLIC BLOOD PRESSURE: 87 MMHG | SYSTOLIC BLOOD PRESSURE: 134 MMHG | RESPIRATION RATE: 18 BRPM | HEART RATE: 81 BPM | TEMPERATURE: 98 F | WEIGHT: 214 LBS

## 2023-01-12 DIAGNOSIS — B34.9 VIRAL SYNDROME: Primary | ICD-10-CM

## 2023-01-12 DIAGNOSIS — J02.9 SORE THROAT: ICD-10-CM

## 2023-01-12 LAB
CONTROL LINE PRESENT WITH A CLEAR BACKGROUND (YES/NO): YES YES/NO
STREP GRP A CUL-SCR: NEGATIVE

## 2023-01-12 PROCEDURE — 3008F BODY MASS INDEX DOCD: CPT | Performed by: NURSE PRACTITIONER

## 2023-01-12 PROCEDURE — 3079F DIAST BP 80-89 MM HG: CPT | Performed by: NURSE PRACTITIONER

## 2023-01-12 PROCEDURE — 3075F SYST BP GE 130 - 139MM HG: CPT | Performed by: NURSE PRACTITIONER

## 2023-01-12 PROCEDURE — 87081 CULTURE SCREEN ONLY: CPT | Performed by: NURSE PRACTITIONER

## 2023-01-12 PROCEDURE — 99213 OFFICE O/P EST LOW 20 MIN: CPT | Performed by: NURSE PRACTITIONER

## 2023-01-12 PROCEDURE — 87637 SARSCOV2&INF A&B&RSV AMP PRB: CPT | Performed by: NURSE PRACTITIONER

## 2023-01-12 PROCEDURE — 87880 STREP A ASSAY W/OPTIC: CPT | Performed by: NURSE PRACTITIONER

## 2023-01-13 LAB
FLUAV + FLUBV RNA SPEC NAA+PROBE: NOT DETECTED
FLUAV + FLUBV RNA SPEC NAA+PROBE: NOT DETECTED
RSV RNA SPEC NAA+PROBE: NOT DETECTED
SARS-COV-2 RNA RESP QL NAA+PROBE: NOT DETECTED

## 2023-01-18 ENCOUNTER — OFFICE VISIT (OUTPATIENT)
Dept: FAMILY MEDICINE CLINIC | Facility: CLINIC | Age: 23
End: 2023-01-18
Payer: COMMERCIAL

## 2023-01-18 VITALS
TEMPERATURE: 98 F | SYSTOLIC BLOOD PRESSURE: 122 MMHG | BODY MASS INDEX: 32.43 KG/M2 | RESPIRATION RATE: 16 BRPM | WEIGHT: 214 LBS | HEIGHT: 68 IN | DIASTOLIC BLOOD PRESSURE: 76 MMHG | OXYGEN SATURATION: 99 % | HEART RATE: 83 BPM

## 2023-01-18 DIAGNOSIS — R09.82 POST-NASAL DRAINAGE: ICD-10-CM

## 2023-01-18 DIAGNOSIS — J02.9 SORE THROAT: Primary | ICD-10-CM

## 2023-01-18 PROCEDURE — 3074F SYST BP LT 130 MM HG: CPT | Performed by: FAMILY MEDICINE

## 2023-01-18 PROCEDURE — 3078F DIAST BP <80 MM HG: CPT | Performed by: FAMILY MEDICINE

## 2023-01-18 PROCEDURE — 3008F BODY MASS INDEX DOCD: CPT | Performed by: FAMILY MEDICINE

## 2023-01-18 PROCEDURE — 99213 OFFICE O/P EST LOW 20 MIN: CPT | Performed by: FAMILY MEDICINE

## 2023-01-18 RX ORDER — AMOXICILLIN 875 MG/1
875 TABLET, COATED ORAL 2 TIMES DAILY
Qty: 20 TABLET | Refills: 0 | Status: SHIPPED | OUTPATIENT
Start: 2023-01-18

## 2023-01-20 DIAGNOSIS — L72.9 SCALP CYST: Primary | ICD-10-CM

## 2023-02-15 ENCOUNTER — OFFICE VISIT (OUTPATIENT)
Facility: LOCATION | Age: 23
End: 2023-02-15
Payer: COMMERCIAL

## 2023-02-15 VITALS — HEART RATE: 81 BPM | TEMPERATURE: 97 F

## 2023-02-15 DIAGNOSIS — L72.9 SCALP CYST: Primary | ICD-10-CM

## 2023-02-15 PROCEDURE — 99242 OFF/OP CONSLTJ NEW/EST SF 20: CPT | Performed by: SURGERY

## 2023-02-20 PROBLEM — F41.8 POSTPARTUM ANXIETY: Status: ACTIVE | Noted: 2023-02-20

## 2023-02-20 PROBLEM — E66.9 OBESITY (BMI 30-39.9): Status: ACTIVE | Noted: 2022-11-18

## 2023-02-20 PROBLEM — O14.10 PREECLAMPSIA, SEVERE (HCC): Status: RESOLVED | Noted: 2022-11-18 | Resolved: 2023-02-20

## 2023-02-20 PROBLEM — O14.10 PREECLAMPSIA, SEVERE: Status: ACTIVE | Noted: 2022-11-18

## 2023-02-20 PROBLEM — O14.10 PREECLAMPSIA, SEVERE (HCC): Status: ACTIVE | Noted: 2022-11-18

## 2023-02-20 PROBLEM — O14.10 PREECLAMPSIA, SEVERE: Status: RESOLVED | Noted: 2022-11-18 | Resolved: 2023-02-20

## 2023-02-20 PROBLEM — F41.8 POSTPARTUM ANXIETY (HCC): Status: ACTIVE | Noted: 2023-02-20

## 2023-02-20 PROBLEM — Z97.5 NEXPLANON IN PLACE: Status: RESOLVED | Noted: 2017-05-09 | Resolved: 2023-02-20

## 2023-02-22 PROBLEM — F41.0 PANIC ATTACKS: Status: ACTIVE | Noted: 2023-02-22

## 2023-02-22 PROBLEM — F42.8 OBSESSIVE THINKING: Status: ACTIVE | Noted: 2023-02-22

## 2023-03-07 PROBLEM — F51.02 ADJUSTMENT INSOMNIA: Status: ACTIVE | Noted: 2023-03-07

## 2023-03-19 ENCOUNTER — ANESTHESIA EVENT (OUTPATIENT)
Dept: SURGERY | Facility: HOSPITAL | Age: 23
End: 2023-03-19
Payer: COMMERCIAL

## 2023-03-20 ENCOUNTER — HOSPITAL ENCOUNTER (OUTPATIENT)
Facility: HOSPITAL | Age: 23
Setting detail: HOSPITAL OUTPATIENT SURGERY
Discharge: HOME | End: 2023-03-20
Attending: SURGERY | Admitting: SURGERY
Payer: COMMERCIAL

## 2023-03-20 ENCOUNTER — ANESTHESIA (OUTPATIENT)
Dept: SURGERY | Facility: HOSPITAL | Age: 23
End: 2023-03-20
Payer: COMMERCIAL

## 2023-03-20 VITALS
HEART RATE: 70 BPM | SYSTOLIC BLOOD PRESSURE: 138 MMHG | RESPIRATION RATE: 16 BRPM | DIASTOLIC BLOOD PRESSURE: 66 MMHG | TEMPERATURE: 98 F | HEIGHT: 68 IN | WEIGHT: 204 LBS | BODY MASS INDEX: 30.92 KG/M2 | OXYGEN SATURATION: 99 %

## 2023-03-20 DIAGNOSIS — L72.9 SCALP CYST: ICD-10-CM

## 2023-03-20 PROCEDURE — 0HB0XZZ EXCISION OF SCALP SKIN, EXTERNAL APPROACH: ICD-10-PCS | Performed by: SURGERY

## 2023-03-20 PROCEDURE — 88304 TISSUE EXAM BY PATHOLOGIST: CPT | Performed by: SURGERY

## 2023-03-20 RX ORDER — BUPIVACAINE HYDROCHLORIDE AND EPINEPHRINE 5; 5 MG/ML; UG/ML
INJECTION, SOLUTION EPIDURAL; INTRACAUDAL; PERINEURAL AS NEEDED
Status: DISCONTINUED | OUTPATIENT
Start: 2023-03-20 | End: 2023-03-20

## 2023-03-20 RX ORDER — SCOLOPAMINE TRANSDERMAL SYSTEM 1 MG/1
1 PATCH, EXTENDED RELEASE TRANSDERMAL ONCE
Status: DISCONTINUED | OUTPATIENT
Start: 2023-03-20 | End: 2023-03-20 | Stop reason: HOSPADM

## 2023-03-20 RX ORDER — SODIUM CHLORIDE, SODIUM LACTATE, POTASSIUM CHLORIDE, CALCIUM CHLORIDE 600; 310; 30; 20 MG/100ML; MG/100ML; MG/100ML; MG/100ML
INJECTION, SOLUTION INTRAVENOUS CONTINUOUS
Status: DISCONTINUED | OUTPATIENT
Start: 2023-03-20 | End: 2023-03-20

## 2023-03-20 RX ORDER — KETOROLAC TROMETHAMINE 30 MG/ML
INJECTION, SOLUTION INTRAMUSCULAR; INTRAVENOUS AS NEEDED
Status: DISCONTINUED | OUTPATIENT
Start: 2023-03-20 | End: 2023-03-20 | Stop reason: SURG

## 2023-03-20 RX ORDER — ACETAMINOPHEN AND CODEINE PHOSPHATE 300; 30 MG/1; MG/1
1-2 TABLET ORAL EVERY 6 HOURS PRN
Qty: 10 TABLET | Refills: 0 | Status: SHIPPED | OUTPATIENT
Start: 2023-03-20

## 2023-03-20 RX ORDER — MIDAZOLAM HYDROCHLORIDE 1 MG/ML
INJECTION INTRAMUSCULAR; INTRAVENOUS AS NEEDED
Status: DISCONTINUED | OUTPATIENT
Start: 2023-03-20 | End: 2023-03-20 | Stop reason: SURG

## 2023-03-20 RX ORDER — DEXAMETHASONE SODIUM PHOSPHATE 4 MG/ML
VIAL (ML) INJECTION AS NEEDED
Status: DISCONTINUED | OUTPATIENT
Start: 2023-03-20 | End: 2023-03-20 | Stop reason: SURG

## 2023-03-20 RX ORDER — MIDAZOLAM HYDROCHLORIDE 1 MG/ML
1 INJECTION INTRAMUSCULAR; INTRAVENOUS EVERY 5 MIN PRN
Status: DISCONTINUED | OUTPATIENT
Start: 2023-03-20 | End: 2023-03-20

## 2023-03-20 RX ORDER — LIDOCAINE HYDROCHLORIDE 10 MG/ML
INJECTION, SOLUTION INFILTRATION; PERINEURAL AS NEEDED
Status: DISCONTINUED | OUTPATIENT
Start: 2023-03-20 | End: 2023-03-20

## 2023-03-20 RX ORDER — METOCLOPRAMIDE HYDROCHLORIDE 5 MG/ML
INJECTION INTRAMUSCULAR; INTRAVENOUS AS NEEDED
Status: DISCONTINUED | OUTPATIENT
Start: 2023-03-20 | End: 2023-03-20 | Stop reason: SURG

## 2023-03-20 RX ORDER — NALOXONE HYDROCHLORIDE 0.4 MG/ML
80 INJECTION, SOLUTION INTRAMUSCULAR; INTRAVENOUS; SUBCUTANEOUS AS NEEDED
Status: DISCONTINUED | OUTPATIENT
Start: 2023-03-20 | End: 2023-03-20

## 2023-03-20 RX ORDER — LABETALOL HYDROCHLORIDE 5 MG/ML
5 INJECTION, SOLUTION INTRAVENOUS EVERY 5 MIN PRN
Status: DISCONTINUED | OUTPATIENT
Start: 2023-03-20 | End: 2023-03-20

## 2023-03-20 RX ORDER — ONDANSETRON 2 MG/ML
4 INJECTION INTRAMUSCULAR; INTRAVENOUS EVERY 6 HOURS PRN
Status: DISCONTINUED | OUTPATIENT
Start: 2023-03-20 | End: 2023-03-20

## 2023-03-20 RX ORDER — HYDROCODONE BITARTRATE AND ACETAMINOPHEN 10; 325 MG/1; MG/1
2 TABLET ORAL ONCE AS NEEDED
Status: DISCONTINUED | OUTPATIENT
Start: 2023-03-20 | End: 2023-03-20

## 2023-03-20 RX ORDER — PROCHLORPERAZINE EDISYLATE 5 MG/ML
5 INJECTION INTRAMUSCULAR; INTRAVENOUS EVERY 8 HOURS PRN
Status: DISCONTINUED | OUTPATIENT
Start: 2023-03-20 | End: 2023-03-20

## 2023-03-20 RX ORDER — HYDROMORPHONE HYDROCHLORIDE 1 MG/ML
0.2 INJECTION, SOLUTION INTRAMUSCULAR; INTRAVENOUS; SUBCUTANEOUS EVERY 5 MIN PRN
Status: DISCONTINUED | OUTPATIENT
Start: 2023-03-20 | End: 2023-03-20

## 2023-03-20 RX ORDER — LIDOCAINE HYDROCHLORIDE 10 MG/ML
INJECTION, SOLUTION EPIDURAL; INFILTRATION; INTRACAUDAL; PERINEURAL AS NEEDED
Status: DISCONTINUED | OUTPATIENT
Start: 2023-03-20 | End: 2023-03-20 | Stop reason: SURG

## 2023-03-20 RX ORDER — ACETAMINOPHEN 500 MG
1000 TABLET ORAL ONCE
Status: DISCONTINUED | OUTPATIENT
Start: 2023-03-20 | End: 2023-03-20 | Stop reason: HOSPADM

## 2023-03-20 RX ORDER — ACETAMINOPHEN 500 MG
1000 TABLET ORAL ONCE AS NEEDED
Status: DISCONTINUED | OUTPATIENT
Start: 2023-03-20 | End: 2023-03-20

## 2023-03-20 RX ORDER — HYDROMORPHONE HYDROCHLORIDE 1 MG/ML
0.6 INJECTION, SOLUTION INTRAMUSCULAR; INTRAVENOUS; SUBCUTANEOUS EVERY 5 MIN PRN
Status: DISCONTINUED | OUTPATIENT
Start: 2023-03-20 | End: 2023-03-20

## 2023-03-20 RX ORDER — HYDROMORPHONE HYDROCHLORIDE 1 MG/ML
0.4 INJECTION, SOLUTION INTRAMUSCULAR; INTRAVENOUS; SUBCUTANEOUS EVERY 5 MIN PRN
Status: DISCONTINUED | OUTPATIENT
Start: 2023-03-20 | End: 2023-03-20

## 2023-03-20 RX ORDER — ONDANSETRON 2 MG/ML
INJECTION INTRAMUSCULAR; INTRAVENOUS AS NEEDED
Status: DISCONTINUED | OUTPATIENT
Start: 2023-03-20 | End: 2023-03-20 | Stop reason: SURG

## 2023-03-20 RX ORDER — CEFAZOLIN SODIUM/WATER 2 G/20 ML
2 SYRINGE (ML) INTRAVENOUS ONCE
Status: COMPLETED | OUTPATIENT
Start: 2023-03-20 | End: 2023-03-20

## 2023-03-20 RX ORDER — MEPERIDINE HYDROCHLORIDE 25 MG/ML
12.5 INJECTION INTRAMUSCULAR; INTRAVENOUS; SUBCUTANEOUS AS NEEDED
Status: DISCONTINUED | OUTPATIENT
Start: 2023-03-20 | End: 2023-03-20

## 2023-03-20 RX ORDER — HYDROCODONE BITARTRATE AND ACETAMINOPHEN 10; 325 MG/1; MG/1
1 TABLET ORAL ONCE AS NEEDED
Status: DISCONTINUED | OUTPATIENT
Start: 2023-03-20 | End: 2023-03-20

## 2023-03-20 RX ORDER — HYDROMORPHONE HYDROCHLORIDE 1 MG/ML
INJECTION, SOLUTION INTRAMUSCULAR; INTRAVENOUS; SUBCUTANEOUS
Status: COMPLETED
Start: 2023-03-20 | End: 2023-03-20

## 2023-03-20 RX ADMIN — MIDAZOLAM HYDROCHLORIDE 2 MG: 1 INJECTION INTRAMUSCULAR; INTRAVENOUS at 08:57:00

## 2023-03-20 RX ADMIN — LIDOCAINE HYDROCHLORIDE 50 MG: 10 INJECTION, SOLUTION EPIDURAL; INFILTRATION; INTRACAUDAL; PERINEURAL at 08:57:00

## 2023-03-20 RX ADMIN — METOCLOPRAMIDE HYDROCHLORIDE 10 MG: 5 INJECTION INTRAMUSCULAR; INTRAVENOUS at 08:57:00

## 2023-03-20 RX ADMIN — DEXAMETHASONE SODIUM PHOSPHATE 4 MG: 4 MG/ML VIAL (ML) INJECTION at 08:57:00

## 2023-03-20 RX ADMIN — ONDANSETRON 4 MG: 2 INJECTION INTRAMUSCULAR; INTRAVENOUS at 08:57:00

## 2023-03-20 RX ADMIN — KETOROLAC TROMETHAMINE 30 MG: 30 INJECTION, SOLUTION INTRAMUSCULAR; INTRAVENOUS at 09:17:00

## 2023-03-20 RX ADMIN — CEFAZOLIN SODIUM/WATER 2 G: 2 G/20 ML SYRINGE (ML) INTRAVENOUS at 09:04:00

## 2023-03-20 RX ADMIN — SODIUM CHLORIDE, SODIUM LACTATE, POTASSIUM CHLORIDE, CALCIUM CHLORIDE: 600; 310; 30; 20 INJECTION, SOLUTION INTRAVENOUS at 09:31:00

## 2023-03-20 NOTE — DISCHARGE INSTRUCTIONS
Home Care Instructions  Minor Surgery  Dr. Woo Timmons  For post-operative pain control the mediations are usually over the counter preparations such as Advil and Tylenol. For severe pain the patient may overlap Advil with Tylenol. The patient can do this by taking two Tylenol, then three hours later taking two Advil, then three hours later taking Tylenol again. All home medications may be resumed as scheduled. Generally aspirin is avoided for ten days. You received a drug called Toradol which is an anti inflammatory at: 9:17pm  Do not take any anti inflammatory like Motrin, Aleve, or Ibuprofen until after: 3:17pm    If you are allowed to take anti inflammatories  Please report any suspected allergic reactions or bleeding issues to your doctor. DIET  The patient may resume a general diet immediately. There should be no alcohol consumption in the immediate recover time period. If the patient was sedated for the procedure the first meal should be light. WOUND CARE  The top dressing may be removed the day after surgery. This includes the gauze, tape and band-aids if they are present. The patient may shower the day of surgery. There is no need to cover the incisions, and all top gauze type dressing should be removed prior to showering. Soap can get on the wounds but do not scrub over the wounds. No hair dye or chemicals of any kind should get in the wounds. Avoid tub baths for two weeks. The doctor or nurse will remove any visible sutures, or staples, in the office. ACTIVITY  The patient may ride in a car but should not drive the car for at least overnight if sedation was used. If no sedation was used the patient may drive immediately. The patient may return to work the next day. Avoid any activity that could lead to the wound getting elbowed or bumped. Avoid bending, pushing, pulling and lifting anything heavier than 25 pounds for two weeks.   Patients should seek further activity limits at the time of their appointment. No extreme sports for two weeks. APPOINTMENT  Please call our office today for an appointment within five to ten days of discharge. Any fever greater than 100.5, chills, nausea, vomiting, or severe diarrhea please call our office. If the wound turns red, hot, swollen, becomes increasingly painful, or drains pus call us immediately at [phone]. For life threatening emergencies call 911. For non-emergent care please call our office after 8:30 a.m. Monday through Friday. The number listed above is our office number. Our phone automatically switches to out answering service if we are not there. Please do not use the emergency room for non-urgent care. Thank you for entrusting us with your care.   [facility]--General Surgery

## 2023-03-20 NOTE — OPERATIVE REPORT
[facility]  Op Note    Bonnie Hankins Location: OR   CSN [id number] MRN [id number]   Admission Date 3/20/2023 Operation Date 3/20/2023   Attending Physician Kingsley Suazo MD Operating Physician Nadeem Mckeon MD   DATE OF OPERATION:  3/20/2023  PREOPERATIVE DIAGNOSIS: Pilar cyst  POSTOPERATIVE DIAGNOSIS: Pilar cyst  PROCEDURE PERFORMED: Excision of pilar cyst.   SURGEON:  Nadeem Mckeon MD  ASSISTANT: Kt Dobson PA-C  ANESTHESIA: General.   SPECIMEN: Pilar cyst to Pathology. BLOOD LOSS: 3 mL. COMPLICATIONS: None. INDICATION FOR OPERATION: The patient is a 21-year-old female who noticed a cyst on her scalp that has grown in size. Physical exam revealed a pilar cyst.  She was offered excision of the cyst.  The risks, benefits, and alternatives of this procedure were discussed in detail with the patient. Risks include, but are not limited to, bleeding, wound infection, and recurrence of the cyst.  The patient was agreeable to proceed with the operation. OPERATIVE TECHNIQUE: After informed consent was obtained, the patient was taken to the operating room and placed in supine position. General anesthesia was induced via an LMA. Gel was placed in the patient's hair to move it away from the area of the cyst.  The cyst was then prepped with Betadine and draped in the usual fashion. 1% lidocaine was infiltrated into the area of the cyst.  A 15 blade scalpel was used to make an incision over the area of greatest prominence. Tenotomy scissors were then used to bluntly dissect the cyst free from the surrounding tissue. The cyst was delivered from the wound. Cautery was used to obtain hemostasis. The incision was closed using 2 sutures of 4-0 nylon. Bacitracin was placed over the incision. The patient tolerated the procedure well. She was extubated in the OR and went to the PACU in good condition.     Nadeem Mckeon MD

## 2023-03-20 NOTE — ANESTHESIA PROCEDURE NOTES
Airway  Date/Time: 3/20/2023 8:59 AM  Urgency: elective      General Information and Staff    Patient location during procedure: OR  Anesthesiologist: Aisha Mccarty MD  Performed: anesthesiologist   Performed by: Aisha Mccarty MD  Authorized by: Aisha Mccarty MD      Indications and Patient Condition  Indications for airway management: anesthesia  Sedation level: deep  Preoxygenated: yes  Patient position: sniffing  Mask difficulty assessment: 1 - vent by mask    Final Airway Details  Final airway type: supraglottic airway      Successful airway: classic  Size 3       Number of attempts at approach: 1

## 2023-03-28 ENCOUNTER — OFFICE VISIT (OUTPATIENT)
Facility: LOCATION | Age: 23
End: 2023-03-28

## 2023-03-28 VITALS — HEART RATE: 68 BPM | TEMPERATURE: 98 F

## 2023-03-28 DIAGNOSIS — Z98.890 POST-OPERATIVE STATE: Primary | ICD-10-CM

## 2023-03-28 PROCEDURE — 99024 POSTOP FOLLOW-UP VISIT: CPT

## 2023-11-07 PROBLEM — F41.0 PANIC: Status: ACTIVE | Noted: 2023-11-07

## 2024-04-29 ENCOUNTER — TELEPHONE (OUTPATIENT)
Dept: FAMILY MEDICINE CLINIC | Facility: CLINIC | Age: 24
End: 2024-04-29

## 2024-04-29 NOTE — TELEPHONE ENCOUNTER
Future Appointments   Date Time Provider Department Center   5/2/2024  1:40 PM Wilmer Moralez MD [facility]   5/29/2024  8:00 AM Wilmer Moralez MD [facility]   8/1/2024  9:20 AM Paty Becerra APRN [id number]

## 2024-05-02 ENCOUNTER — OFFICE VISIT (OUTPATIENT)
Dept: FAMILY MEDICINE CLINIC | Facility: CLINIC | Age: 24
End: 2024-05-02
Payer: COMMERCIAL

## 2024-05-02 VITALS
BODY MASS INDEX: 33 KG/M2 | OXYGEN SATURATION: 99 % | WEIGHT: 214 LBS | DIASTOLIC BLOOD PRESSURE: 70 MMHG | SYSTOLIC BLOOD PRESSURE: 110 MMHG | HEART RATE: 97 BPM | TEMPERATURE: 98 F

## 2024-05-02 DIAGNOSIS — K52.9 GASTROENTERITIS: ICD-10-CM

## 2024-05-02 DIAGNOSIS — Z87.440 HISTORY OF UTI: Primary | ICD-10-CM

## 2024-05-02 LAB
BASOPHILS # BLD AUTO: 0.03 X10(3) UL (ref 0–0.2)
BASOPHILS NFR BLD AUTO: 0.6 %
EOSINOPHIL # BLD AUTO: 0.05 X10(3) UL (ref 0–0.7)
EOSINOPHIL NFR BLD AUTO: 0.9 %
ERYTHROCYTE [DISTWIDTH] IN BLOOD BY AUTOMATED COUNT: 12.2 %
GLUCOSE (URINE DIPSTICK): NEGATIVE MG/DL
HCT VFR BLD AUTO: 42.1 %
HGB BLD-MCNC: 14.5 G/DL
IMM GRANULOCYTES # BLD AUTO: 0.01 X10(3) UL (ref 0–1)
IMM GRANULOCYTES NFR BLD: 0.2 %
LEUKOCYTES: NEGATIVE
LYMPHOCYTES # BLD AUTO: 1.03 X10(3) UL (ref 1–4)
LYMPHOCYTES NFR BLD AUTO: 19.4 %
MCH RBC QN AUTO: 30.9 PG (ref 26–34)
MCHC RBC AUTO-ENTMCNC: 34.4 G/DL (ref 31–37)
MCV RBC AUTO: 89.6 FL
MONOCYTES # BLD AUTO: 0.43 X10(3) UL (ref 0.1–1)
MONOCYTES NFR BLD AUTO: 8.1 %
MULTISTIX LOT#: ABNORMAL NUMERIC
NEUTROPHILS # BLD AUTO: 3.76 X10 (3) UL (ref 1.5–7.7)
NEUTROPHILS # BLD AUTO: 3.76 X10(3) UL (ref 1.5–7.7)
NEUTROPHILS NFR BLD AUTO: 70.8 %
NITRITE, URINE: NEGATIVE
OCCULT BLOOD: NEGATIVE
PH, URINE: 7 (ref 4.5–8)
PLATELET # BLD AUTO: 238 10(3)UL (ref 150–450)
PROTEIN (URINE DIPSTICK): NEGATIVE MG/DL
RBC # BLD AUTO: 4.7 X10(6)UL
SPECIFIC GRAVITY: 1.02 (ref 1–1.03)
UROBILINOGEN,SEMI-QN: 1 MG/DL (ref 0–1.9)
WBC # BLD AUTO: 5.3 X10(3) UL (ref 4–11)

## 2024-05-02 PROCEDURE — 99214 OFFICE O/P EST MOD 30 MIN: CPT | Performed by: FAMILY MEDICINE

## 2024-05-02 PROCEDURE — 85025 COMPLETE CBC W/AUTO DIFF WBC: CPT | Performed by: FAMILY MEDICINE

## 2024-05-02 PROCEDURE — 3074F SYST BP LT 130 MM HG: CPT | Performed by: FAMILY MEDICINE

## 2024-05-02 PROCEDURE — 87086 URINE CULTURE/COLONY COUNT: CPT | Performed by: FAMILY MEDICINE

## 2024-05-02 PROCEDURE — 80053 COMPREHEN METABOLIC PANEL: CPT | Performed by: FAMILY MEDICINE

## 2024-05-02 PROCEDURE — 3078F DIAST BP <80 MM HG: CPT | Performed by: FAMILY MEDICINE

## 2024-05-02 PROCEDURE — 81001 URINALYSIS AUTO W/SCOPE: CPT | Performed by: FAMILY MEDICINE

## 2024-05-02 RX ORDER — CEFDINIR 300 MG/1
300 CAPSULE ORAL
COMMUNITY
Start: 2024-04-26 | End: 2024-05-03

## 2024-05-02 RX ORDER — SACCHAROMYCES BOULARDII 250 MG
250 CAPSULE ORAL 3 TIMES DAILY
COMMUNITY
Start: 2024-04-26 | End: 2024-05-02 | Stop reason: ALTCHOICE

## 2024-05-02 RX ORDER — ONDANSETRON 4 MG/1
4 TABLET, ORALLY DISINTEGRATING ORAL
COMMUNITY
Start: 2024-04-26

## 2024-05-02 NOTE — PROGRESS NOTES
Chief Complaint   Patient presents with    Follow - Up         HPI  Pt was recently in the ER and treated for dehydration secondary to gastroenteritis.  She was also found to have acute cystitis and treated for his tear.  She is doing much better but still feels very fatigued and weak and does not feel quite right.  Advised that we recheck blood work and check a UA to ensure that she has cleared the urine infection.  She is concerned because she is constipated at this time and does admit to having taken Imodium when she was sick.    ROS  As per HPI and all other systems reviewed and are negative      Past Medical History:    Anxiety    Arrest of dilation, delivered, current hospitalization (HCC)    Last Assessment & Plan:  Formatting of this note might be different from the original. - Patient remained 6 cm after 6 hours of inadequate contractions    Benign neoplasm of skin of trunk, except scrotum    HERB (generalized anxiety disorder)    Mononucleosis syndrome    Nexplanon in place    Nexplanon placed 17    Obsessive thinking    Panic attacks    Postpartum anxiety (HCC)    Preeclampsia, severe (HCC)    Last Assessment & Plan:  Formatting of this note might be different from the original. Started on nifedipine 30 mg on POD#1    Traumatic birth    Visual impairment    glasses    Vitamin D deficiency       Past Surgical History:   Procedure Laterality Date          Other surgical history      wisdom teeth    Other surgical history  2023    Excision of pilar cyst       Social History     Socioeconomic History    Marital status: Single   Tobacco Use    Smoking status: Never    Smokeless tobacco: Never    Tobacco comments:     non-smoker   Vaping Use    Vaping status: Some Days   Substance and Sexual Activity    Alcohol use: Yes     Comment: soc, 2x month    Drug use: Not Currently     Types: Cannabis       Family History   Problem Relation Age of Onset    Anxiety Mother     Heart Disease Maternal  Grandfather     Stroke Paternal Grandfather         Current Outpatient Medications on File Prior to Visit   Medication Sig Dispense Refill    cefdinir 300 MG Oral Cap Take 1 capsule (300 mg total) by mouth.      ondansetron 4 MG Oral Tablet Dispersible Take 1 tablet (4 mg total) by mouth.      clonazePAM (KLONOPIN) 1 MG Oral Tab Take 1 tablet (1 mg total) by mouth 2 (two) times daily. 60 tablet 5    busPIRone 10 MG Oral Tab Take 2 tablets (20 mg total) by mouth at bedtime. 180 tablet 1    desvenlafaxine ER (PRISTIQ) 50 MG Oral Tablet 24 Hr Take 1 tablet (50 mg total) by mouth daily. W 25 mg for 75 mg/day 90 tablet 1    desvenlafaxine ER 25 MG Oral Tablet 24 Hr Take 1 tablet (25 mg total) by mouth daily. W 50 mg for 75 mg/day 90 tablet 1    desvenlafaxine ER 50 MG Oral Tablet 24 Hr Take 1 tablet (50 mg total) by mouth daily. W 25 mg for 75 mg daily 90 tablet 1    busPIRone 10 MG Oral Tab Take 2 tablets (20 mg total) by mouth at bedtime. 180 tablet 1    desvenlafaxine ER 25 MG Oral Tablet 24 Hr Take 1 tablet (25 mg total) by mouth daily. Along w 50 mg for total 75 mg/day. 90 tablet 1    medroxyPROGESTERone acetate 104 MG/0.65ML Subcutaneous Suspension Prefilled Syringe Inject 0.65 mL (104 mg total) into the skin once.       No current facility-administered medications on file prior to visit.         Objective  Vitals:    05/02/24 1336   BP: 110/70   Pulse: 97   Temp: 98.1 °F (36.7 °C)   TempSrc: Temporal   SpO2: 99%   Weight: 214 lb (97.1 kg)     Physical Exam  Constitutional:       Appearance: Normal appearance.   HEENT:      Head: Normocephalic and atraumatic.      Eyes: PERRLA no notable nystagmus     Ears: normal on observation     Nose: Nose normal.      Mouth: Mucous membranes are moist.      Neck: no masses no bruit  Cardiovascular:      Rate and Rhythm: Normal rate and regular rhythm.   Pulmonary:      Effort: Pulmonary effort is normal.      Breath sounds: Normal breath sounds.   Abdominal:      General:  Bowel sounds are normal.      Palpations: Abdomen is soft. There is no mass. Slight discomfort LLQ  Musculoskeletal:         General: Normal range of motion.      Cervical back: Normal range of motion.   Skin:     General: Skin is warm and dry.   Neurological:      General: No focal deficit present.      Mental Status: She is alert and oriented to person, place, and time.   Psychiatric:         Mood and Affect: Mood normal.         Thought Content: Thought content normal.       Assessment and Plan  Noemi was seen today for follow - up.    Diagnoses and all orders for this visit:    History of UTI  -     CBC W Differential W Platelet [E]; Future  -     Comp Metabolic Panel (14) [E]; Future  -     Urine Dip, auto with Micro  -     CBC W Differential W Platelet [E]  -     Comp Metabolic Panel (14) [E]  -     Urine Culture, Routine [E]; Future  -     Urine Culture, Routine [E]    Gastroenteritis  -     CBC W Differential W Platelet [E]; Future  -     Comp Metabolic Panel (14) [E]; Future  -     Urine Dip, auto with Micro  -     CBC W Differential W Platelet [E]  -     Comp Metabolic Panel (14) [E]           Follow up  No follow-ups on file.      Patient Instructions  There are no Patient Instructions on file for this visit.       Wilmer Moralez MD

## 2024-05-03 LAB
ALBUMIN SERPL-MCNC: 4.4 G/DL (ref 3.4–5)
ALBUMIN/GLOB SERPL: 1.3 {RATIO} (ref 1–2)
ALP LIVER SERPL-CCNC: 70 U/L
ALT SERPL-CCNC: 16 U/L
ANION GAP SERPL CALC-SCNC: 6 MMOL/L (ref 0–18)
AST SERPL-CCNC: 11 U/L (ref 15–37)
BILIRUB SERPL-MCNC: 1.4 MG/DL (ref 0.1–2)
BUN BLD-MCNC: 9 MG/DL (ref 9–23)
CALCIUM BLD-MCNC: 9.3 MG/DL (ref 8.5–10.1)
CHLORIDE SERPL-SCNC: 107 MMOL/L (ref 98–112)
CO2 SERPL-SCNC: 25 MMOL/L (ref 21–32)
CREAT BLD-MCNC: 0.95 MG/DL
EGFRCR SERPLBLD CKD-EPI 2021: 85 ML/MIN/1.73M2 (ref 60–?)
FASTING STATUS PATIENT QL REPORTED: NO
GLOBULIN PLAS-MCNC: 3.3 G/DL (ref 2.8–4.4)
GLUCOSE BLD-MCNC: 90 MG/DL (ref 70–99)
OSMOLALITY SERPL CALC.SUM OF ELEC: 284 MOSM/KG (ref 275–295)
POTASSIUM SERPL-SCNC: 3.7 MMOL/L (ref 3.5–5.1)
PROT SERPL-MCNC: 7.7 G/DL (ref 6.4–8.2)
SODIUM SERPL-SCNC: 138 MMOL/L (ref 136–145)

## 2024-05-14 ENCOUNTER — OFFICE VISIT (OUTPATIENT)
Dept: FAMILY MEDICINE CLINIC | Facility: CLINIC | Age: 24
End: 2024-05-14

## 2024-05-14 ENCOUNTER — LAB ENCOUNTER (OUTPATIENT)
Dept: LAB | Age: 24
End: 2024-05-14
Attending: NURSE PRACTITIONER

## 2024-05-14 VITALS
HEART RATE: 88 BPM | RESPIRATION RATE: 16 BRPM | TEMPERATURE: 98 F | OXYGEN SATURATION: 98 % | DIASTOLIC BLOOD PRESSURE: 86 MMHG | HEIGHT: 68 IN | BODY MASS INDEX: 32.43 KG/M2 | SYSTOLIC BLOOD PRESSURE: 120 MMHG | WEIGHT: 214 LBS

## 2024-05-14 DIAGNOSIS — R19.7 DIARRHEA, UNSPECIFIED TYPE: ICD-10-CM

## 2024-05-14 DIAGNOSIS — R19.7 DIARRHEA, UNSPECIFIED TYPE: Primary | ICD-10-CM

## 2024-05-14 LAB
CRYPTOSP AG STL QL IA: NEGATIVE
G LAMBLIA AG STL QL IA: NEGATIVE

## 2024-05-14 PROCEDURE — 87329 GIARDIA AG IA: CPT | Performed by: NURSE PRACTITIONER

## 2024-05-14 PROCEDURE — 87427 SHIGA-LIKE TOXIN AG IA: CPT | Performed by: NURSE PRACTITIONER

## 2024-05-14 PROCEDURE — 3079F DIAST BP 80-89 MM HG: CPT | Performed by: NURSE PRACTITIONER

## 2024-05-14 PROCEDURE — 99213 OFFICE O/P EST LOW 20 MIN: CPT | Performed by: NURSE PRACTITIONER

## 2024-05-14 PROCEDURE — 87045 FECES CULTURE AEROBIC BACT: CPT | Performed by: NURSE PRACTITIONER

## 2024-05-14 PROCEDURE — 87272 CRYPTOSPORIDIUM AG IF: CPT | Performed by: NURSE PRACTITIONER

## 2024-05-14 PROCEDURE — 3008F BODY MASS INDEX DOCD: CPT | Performed by: NURSE PRACTITIONER

## 2024-05-14 PROCEDURE — 87046 STOOL CULTR AEROBIC BACT EA: CPT | Performed by: NURSE PRACTITIONER

## 2024-05-14 PROCEDURE — 3074F SYST BP LT 130 MM HG: CPT | Performed by: NURSE PRACTITIONER

## 2024-05-14 NOTE — PROGRESS NOTES
CHIEF COMPLAINT:    Chief Complaint   Patient presents with    Diarrhea     ER follow up , watery stools  Diarrhea started: 4/22/24,        HISTORY OF PRESENT ILLNESS:    Noemi presents today, May 14, 2024, for follow-up on watery diarrhea.  Noemi reports diarrhea that began 04/22/2024 and has not resolved.  Diarrhea is described as water and light brown.  Feels dehydrated today, states mouth is sticky and she feels weak.  Has not taken imodium since episode of constipation.  Experiencing night sweats.  Denies fever or near fainting.  Denies exposure to nursing homes or hospital stays.  Last antibiotic was given within past 30 days, cefdinir.    Noemi works for [employer].  Denies exposure to sewage or pond water.    ALLERGIES:  No Known Allergies    CURRENT MEDICATIONS:  Current Outpatient Medications   Medication Sig Dispense Refill    clonazePAM 0.5 MG Oral Tab Take 2 tablets (1 mg total) by mouth 2 (two) times daily. 120 tablet 0    clonazePAM (KLONOPIN) 1 MG Oral Tab Take 1 tablet (1 mg total) by mouth 2 (two) times daily. 60 tablet 5    busPIRone 10 MG Oral Tab Take 2 tablets (20 mg total) by mouth at bedtime. 180 tablet 1    desvenlafaxine ER (PRISTIQ) 50 MG Oral Tablet 24 Hr Take 1 tablet (50 mg total) by mouth daily. W 25 mg for 75 mg/day 90 tablet 1    desvenlafaxine ER 25 MG Oral Tablet 24 Hr Take 1 tablet (25 mg total) by mouth daily. W 50 mg for 75 mg/day 90 tablet 1    desvenlafaxine ER 50 MG Oral Tablet 24 Hr Take 1 tablet (50 mg total) by mouth daily. W 25 mg for 75 mg daily 90 tablet 1    busPIRone 10 MG Oral Tab Take 2 tablets (20 mg total) by mouth at bedtime. 180 tablet 1    desvenlafaxine ER 25 MG Oral Tablet 24 Hr Take 1 tablet (25 mg total) by mouth daily. Along w 50 mg for total 75 mg/day. 90 tablet 1    medroxyPROGESTERone acetate 104 MG/0.65ML Subcutaneous Suspension Prefilled Syringe Inject 0.65 mL (104 mg total) into the skin once.         MEDICAL  HISTORY:  Past Medical History:    Anxiety    Arrest of dilation, delivered, current hospitalization (HCC)    Last Assessment & Plan:  Formatting of this note might be different from the original. - Patient remained 6 cm after 6 hours of inadequate contractions    Benign neoplasm of skin of trunk, except scrotum    HERB (generalized anxiety disorder)    Mononucleosis syndrome    Nexplanon in place    Nexplanon placed 17    Obsessive thinking    Panic attacks    Postpartum anxiety (HCC)    Preeclampsia, severe (HCC)    Last Assessment & Plan:  Formatting of this note might be different from the original. Started on nifedipine 30 mg on POD#1    Traumatic birth    Visual impairment    glasses    Vitamin D deficiency     Past Surgical History:   Procedure Laterality Date          Other surgical history      wisdom teeth    Other surgical history  2023    Excision of pilar cyst     Family History   Problem Relation Age of Onset    Anxiety Mother     Heart Disease Maternal Grandfather     Stroke Paternal Grandfather      Family Status   Relation Status    Fa Alive    Mo Alive    Son Alive    MGMA Alive    MGFA Alive    PGMA Alive    PGFA Alive    Sis Alive    Bro Alive     Social History     Socioeconomic History    Marital status: Single   Tobacco Use    Smoking status: Never    Smokeless tobacco: Never    Tobacco comments:     non-smoker   Vaping Use    Vaping status: Some Days   Substance and Sexual Activity    Alcohol use: Yes     Comment: soc, 2x month    Drug use: Not Currently     Types: Cannabis     Social Determinants of Health     Financial Resource Strain: Not At Risk (2022)    Received [facility], [facility]    Financial Resource Strain     How hard is it for you to pay for the very basics like food, housing, medical care, and heating?: Not hard at all   Food Insecurity: No Food Insecurity (2024)    Received from [facility]  [place]    Food Insecurity     Currently or in the past 3 months, have you worried your food would run out before you had money to buy more?: No     In the past 12 months, have you run out of food or been unable to get more?: No   Transportation Needs: No Transportation Needs (11/18/2022)    [facility], [facility]    Transportation Needs     Medical Transportation Needs?: Patient refused     Daily Living Transportation Needs? [Peds Only] : Patient refused    Received from [facility], [facility]    Social Connections    Received from [facility], [facility]    Housing Stability       ROS:  GENERAL:  Denies recorded temperatures greater than 100.5F  RESPIRATORY:  Denies difficulty breathing  CARDIAC:  Denies chest pain with exertion    VITALS:   /86   Pulse 110   Temp 97.8 °F (36.6 °C) (Temporal)   Resp 16   Ht 5' 8\" (1.727 m)   Wt 214 lb (97.1 kg)   LMP  (LMP Unknown)   SpO2 98%   BMI 32.54 kg/m²    Reviewed by Alicia Capellan MS, APRN, FNP-BC    PHYSICAL EXAM:    Constitutional:       Appears well.  Sitting upright on exam table.  Well developed, well nourished, and in no acute distress  HEENT:      Facial features symmetric. Normocephalic and atraumatic  Cardiovascular:      Heart sounds: Regular rate and rhythm without murmur     No edema of BLE  Pulmonary:      Chest expansion symmetric.  Breathing nonlabored.  Abdomen:       Nondistended.     Abdomen soft, nontender without organomegaly.       No CVA tenderness.  Musculoskeletal:         Movements smooth and controlled with appropriate coordination.       Gait is steady, nonantalgic.  Neuro:       No focal deficits, cranial nerves grossly intact.       Movements smooth and controlled, appropriate coordination without ataxia or tremors.  Skin:     Warm and dry without jaundice or rashes.  Psychiatric:         Alert and  oriented.  Calm and cooperative.  Speech is clear.     ASSESSMENT & PLAN:    1. Diarrhea, unspecified type  Probiotic  Imodium  Push fluids  ER if s/s as discussed  - Stool Culture w/Shigatoxin [E]; Future  - Giardia + Crypto Antigen, Stool; Future

## 2024-05-16 ENCOUNTER — TELEPHONE (OUTPATIENT)
Dept: FAMILY MEDICINE CLINIC | Facility: CLINIC | Age: 24
End: 2024-05-16

## 2024-05-16 NOTE — TELEPHONE ENCOUNTER
PATIENT APOLOGIZES FOR CALLING FIRST.  SHE RECEIVED HER LAB RESULTS FOR STOOL.  EVERYTHING IS NEGATIVE.  PATIENT SAYS IMODIUM HAS BEEN HELPING BUT STILL LOOSE.  ASKING IF SHE SHOULD TAKE ANOTHER IMODIUM TODAY AND OR ANY RECOMMENDATIONS ON WHAT SHE SHOULD DO.

## 2024-05-16 NOTE — TELEPHONE ENCOUNTER
Per Dr. Fierro:  Have her take pepto and if not better by next week to come and see me. Thank you    Advised patient of Dr. Fierro's note above. Patient verbalized understanding.  Pt stated she would like to not wait another week of having diarrhea  Pt reports has had diarrhea for over a month now. Has ended up in hospital at one point.  Pt saw her stool results are negative and is unsatisfied with waiting another week.  Pt apologizes and states she does not mean to come off as rude, but would like more answers than wait another week    Please advise, thank you

## 2024-05-16 NOTE — TELEPHONE ENCOUNTER
Per Dr. Fierro:  Have her come in to be seen sooner. Tm with Alicia or Saturday with me     Advised patient of Dr. Fierro's note above. Patient verbalized understanding. No further questions at this time.    Future Appointments   Date Time Provider Department Center   5/18/2024 10:40 AM Wilmer Moralez MD [facility] [place]   5/30/2024 10:00 AM Wilmer Moralez MD [facility] [place]   8/1/2024  9:20 AM Paty Becerra APRN [id number]

## 2024-05-16 NOTE — TELEPHONE ENCOUNTER
Please see note below    LOV 05/14/24 w/ Alicia NAVARRO for diarrhea  Stool test ordered and completed    LOV 05/02/24 w/ Dr. Fierro     Please advise, thank you

## 2024-05-17 ENCOUNTER — TELEPHONE (OUTPATIENT)
Dept: GASTROENTEROLOGY | Age: 24
End: 2024-05-17

## 2024-05-17 ENCOUNTER — TELEPHONE (OUTPATIENT)
Dept: FAMILY MEDICINE CLINIC | Facility: CLINIC | Age: 24
End: 2024-05-17

## 2024-05-17 ENCOUNTER — TELEMEDICINE (OUTPATIENT)
Dept: FAMILY MEDICINE CLINIC | Facility: CLINIC | Age: 24
End: 2024-05-17

## 2024-05-17 ENCOUNTER — TELEPHONE (OUTPATIENT)
Facility: CLINIC | Age: 24
End: 2024-05-17

## 2024-05-17 DIAGNOSIS — R19.7 DIARRHEA, UNSPECIFIED TYPE: Primary | ICD-10-CM

## 2024-05-17 DIAGNOSIS — K52.9 GASTROENTERITIS: ICD-10-CM

## 2024-05-17 DIAGNOSIS — R19.7 DIARRHEA OF PRESUMED INFECTIOUS ORIGIN: Primary | ICD-10-CM

## 2024-05-17 RX ORDER — AZITHROMYCIN 500 MG/1
500 TABLET, FILM COATED ORAL DAILY
Qty: 3 TABLET | Refills: 0 | Status: SHIPPED | OUTPATIENT
Start: 2024-05-17 | End: 2024-05-20

## 2024-05-17 NOTE — TELEPHONE ENCOUNTER
Referral placed for patient to see [facility]. Information given to patient verbally and via Mychart.     Appointment for tomorrow has been cancelled.

## 2024-05-17 NOTE — TELEPHONE ENCOUNTER
PATIENT CALLING THIS MORNING.  SHE WOULD REALLY LIKE TO SEE GI.  ASKING IF SHE NEEDS TO SEE DR BARAJAS TOMORROW-IS THERE ANYTHING DR BARAJAS CAN DO.  CAN WE JUST REFER PATIENT TO GI??

## 2024-05-17 NOTE — TELEPHONE ENCOUNTER
Patient is experiencing severe abdominal pain and reports having diarrhea for over a month. Patient is scheduled with Litzy Gutierrez 7/16 and placed on the wait list. Please advise.

## 2024-05-17 NOTE — PROGRESS NOTES
VIDEO VISIT    CHIEF COMPLAINT:  Diarrhea    HISTORY OF PRESENT ILLNESS:  This is a 24 year old female who verbally consents to a video visit today, May 17, 2024 for persistent diarrhea.    Diarrhea began 04/22/2024 and has not improved with use of imodium, pepto bismuth, and probiotic.  Diarrhea is described as water and light brown.  Experiencing night sweats.  Denies fever or near fainting.  Denies exposure to nursing homes or hospital stays.  Denies exposure to sewage or pond water.    Denies resting heart rate above 110.     ALLERGIES:  No Known Allergies    CURRENT MEDICATIONS:  Current Outpatient Medications   Medication Sig Dispense Refill    azithromycin 500 MG Oral Tab Take 1 tablet (500 mg total) by mouth daily for 3 days. 3 tablet 0    clonazePAM 0.5 MG Oral Tab Take 2 tablets (1 mg total) by mouth 2 (two) times daily. 120 tablet 0    clonazePAM (KLONOPIN) 1 MG Oral Tab Take 1 tablet (1 mg total) by mouth 2 (two) times daily. 60 tablet 5    busPIRone 10 MG Oral Tab Take 2 tablets (20 mg total) by mouth at bedtime. 180 tablet 1    desvenlafaxine ER (PRISTIQ) 50 MG Oral Tablet 24 Hr Take 1 tablet (50 mg total) by mouth daily. W 25 mg for 75 mg/day 90 tablet 1    desvenlafaxine ER 25 MG Oral Tablet 24 Hr Take 1 tablet (25 mg total) by mouth daily. W 50 mg for 75 mg/day 90 tablet 1    desvenlafaxine ER 50 MG Oral Tablet 24 Hr Take 1 tablet (50 mg total) by mouth daily. W 25 mg for 75 mg daily 90 tablet 1    busPIRone 10 MG Oral Tab Take 2 tablets (20 mg total) by mouth at bedtime. 180 tablet 1    desvenlafaxine ER 25 MG Oral Tablet 24 Hr Take 1 tablet (25 mg total) by mouth daily. Along w 50 mg for total 75 mg/day. 90 tablet 1    medroxyPROGESTERone acetate 104 MG/0.65ML Subcutaneous Suspension Prefilled Syringe Inject 0.65 mL (104 mg total) into the skin once.         MEDICAL HISTORY:  Past Medical History:    Anxiety    Arrest of dilation, delivered, current hospitalization ([facility])    Last Assessment & Plan:   Formatting of this note might be different from the original. - Patient remained 6 cm after 6 hours of inadequate contractions    Benign neoplasm of skin of trunk, except scrotum    HERB (generalized anxiety disorder)    Mononucleosis syndrome    Nexplanon in place    Nexplanon placed 17    Obsessive thinking    Panic attacks    Postpartum anxiety (HCC)    Preeclampsia, severe (HCC)    Last Assessment & Plan:  Formatting of this note might be different from the original. Started on nifedipine 30 mg on POD#1    Traumatic birth    Visual impairment    glasses    Vitamin D deficiency     Past Surgical History:   Procedure Laterality Date          Other surgical history      wisdom teeth    Other surgical history  2023    Excision of pilar cyst     Family History   Problem Relation Age of Onset    Anxiety Mother     Heart Disease Maternal Grandfather     Stroke Paternal Grandfather      Family Status   Relation Status    Fa Alive    Mo Alive    Son Alive    MGMA Alive    MGFA Alive    PGMA Alive    PGFA Alive    Sis Alive    Bro Alive     Social History     Socioeconomic History    Marital status: Single   Tobacco Use    Smoking status: Never    Smokeless tobacco: Never    Tobacco comments:     non-smoker   Vaping Use    Vaping status: Some Days   Substance and Sexual Activity    Alcohol use: Yes     Comment: soc, 2x month    Drug use: Not Currently     Types: Cannabis     Social Determinants of Health     Financial Resource Strain: Not At Risk (2022)    Received [facility], [facility]    Financial Resource Strain     How hard is it for you to pay for the very basics like food, housing, medical care, and heating?: Not hard at all   Food Insecurity: No Food Insecurity (2024)    Received from [facility]    Food Insecurity     Currently or in the past 3 months, have you worried your food would run out before you had money to buy  more?: No     In the past 12 months, have you run out of food or been unable to get more?: No   Transportation Needs: No Transportation Needs (11/18/2022)    Received from [facility], [facility]    Transportation Needs     Medical Transportation Needs?: Patient refused     Daily Living Transportation Needs? [Peds Only] : Patient refused    Received from [facility], [facility]    Social Connections    Received from [facility], [facility]    Housing Stability       ROS:    GENERAL:  Denies fever   RESPIRATORY:  Denies difficulty breathing  CARDIO:  Denies chest pain with exertion    VITALS:  No vitals were obtained by clinical staff during this visit.      PHYSICAL EXAM:    Physical exam is limited due to video visit.    Noemi Amezquita serves as a reliable historian.  Speech is clear and organized without difficulty speaking in full sentences.    No shortness of breath or cough noted during our conversation.  Overall is feeling tired.    ASSESSMENT & PLAN:    1. Diarrhea of presumed infectious origin  Continue imodium and probiotic  Continue oral hydration   Begin abx  - azithromycin 500 MG Oral Tab; Take 1 tablet (500 mg total) by mouth daily for 3 days.  Dispense: 3 tablet; Refill: 0  - C. diff toxigenic PCR (OPT) [E]; Future

## 2024-05-20 NOTE — TELEPHONE ENCOUNTER
Patient contacted.  She told me that she has an appointment tomorrow with another GI office and asked that I cancel the July appointment that she has with us which was done.

## 2024-05-21 ENCOUNTER — OFFICE VISIT (OUTPATIENT)
Dept: GASTROENTEROLOGY | Age: 24
End: 2024-05-21

## 2024-05-21 ENCOUNTER — TELEPHONE (OUTPATIENT)
Dept: GASTROENTEROLOGY | Age: 24
End: 2024-05-21

## 2024-05-21 VITALS — HEIGHT: 68 IN | WEIGHT: 218 LBS | BODY MASS INDEX: 33.04 KG/M2

## 2024-05-21 DIAGNOSIS — R19.7 DIARRHEA, UNSPECIFIED TYPE: Primary | ICD-10-CM

## 2024-05-21 PROBLEM — K52.9 CHRONIC DIARRHEA: Status: ACTIVE | Noted: 2024-05-21

## 2024-05-21 PROCEDURE — 99204 OFFICE O/P NEW MOD 45 MIN: CPT

## 2024-05-21 RX ORDER — CLONAZEPAM 1 MG/1
1 TABLET ORAL DAILY
COMMUNITY
Start: 2024-02-01

## 2024-05-21 RX ORDER — ETONOGESTREL 68 MG/1
1 IMPLANT SUBCUTANEOUS ONCE
COMMUNITY

## 2024-05-21 RX ORDER — BUSPIRONE HYDROCHLORIDE 10 MG/1
10 TABLET ORAL DAILY
COMMUNITY
Start: 2023-01-20

## 2024-05-21 RX ORDER — DESVENLAFAXINE SUCCINATE 50 MG/1
75 TABLET, EXTENDED RELEASE ORAL DAILY
COMMUNITY
Start: 2023-01-20

## 2024-05-21 ASSESSMENT — ENCOUNTER SYMPTOMS
WEAKNESS: 0
ABDOMINAL DISTENTION: 0
FATIGUE: 0
ANAL BLEEDING: 0
ABDOMINAL PAIN: 0
CONFUSION: 0
TROUBLE SWALLOWING: 0
CHILLS: 0
LIGHT-HEADEDNESS: 0
VOMITING: 0
ACTIVITY CHANGE: 0
COLOR CHANGE: 0
ROS GI COMMENTS: ABDOMINAL CRAMPING
DIARRHEA: 1
CONSTIPATION: 0
DIAPHORESIS: 0
APPETITE CHANGE: 0
NAUSEA: 0
VOICE CHANGE: 0
BLOOD IN STOOL: 0
COUGH: 0
UNEXPECTED WEIGHT CHANGE: 0
FEVER: 0
SHORTNESS OF BREATH: 0
DIZZINESS: 0

## 2024-05-22 ENCOUNTER — LAB SERVICES (OUTPATIENT)
Dept: LAB | Age: 24
End: 2024-05-22

## 2024-05-22 ENCOUNTER — E-ADVICE (OUTPATIENT)
Dept: GASTROENTEROLOGY | Age: 24
End: 2024-05-22

## 2024-05-22 DIAGNOSIS — R19.7 DIARRHEA, UNSPECIFIED TYPE: ICD-10-CM

## 2024-05-22 LAB
ALBUMIN SERPL-MCNC: 4.2 G/DL (ref 3.6–5.1)
ALBUMIN/GLOB SERPL: 1.4 {RATIO} (ref 1–2.4)
ALP SERPL-CCNC: 67 UNITS/L (ref 45–117)
ALT SERPL-CCNC: 21 UNITS/L
ANION GAP SERPL CALC-SCNC: 11 MMOL/L (ref 7–19)
AST SERPL-CCNC: 9 UNITS/L
BASOPHILS # BLD: 0 K/MCL (ref 0–0.3)
BASOPHILS NFR BLD: 1 %
BILIRUB SERPL-MCNC: 0.6 MG/DL (ref 0.2–1)
BUN SERPL-MCNC: 7 MG/DL (ref 6–20)
BUN/CREAT SERPL: 10 (ref 7–25)
CALCIUM SERPL-MCNC: 9.1 MG/DL (ref 8.4–10.2)
CHLORIDE SERPL-SCNC: 103 MMOL/L (ref 97–110)
CO2 SERPL-SCNC: 28 MMOL/L (ref 21–32)
CREAT SERPL-MCNC: 0.69 MG/DL (ref 0.51–0.95)
DEPRECATED RDW RBC: 42.4 FL (ref 39–50)
EGFRCR SERPLBLD CKD-EPI 2021: >90 ML/MIN/{1.73_M2}
EOSINOPHIL # BLD: 0.1 K/MCL (ref 0–0.5)
EOSINOPHIL NFR BLD: 1 %
ERYTHROCYTE [DISTWIDTH] IN BLOOD: 12.7 % (ref 11–15)
FASTING DURATION TIME PATIENT: NORMAL H
GLOBULIN SER-MCNC: 3.1 G/DL (ref 2–4)
GLUCOSE SERPL-MCNC: 77 MG/DL (ref 70–99)
HCT VFR BLD CALC: 41.7 % (ref 36–46.5)
HGB BLD-MCNC: 14 G/DL (ref 12–15.5)
IMM GRANULOCYTES # BLD AUTO: 0 K/MCL (ref 0–0.2)
IMM GRANULOCYTES # BLD: 0 %
LYMPHOCYTES # BLD: 1.5 K/MCL (ref 1–4.8)
LYMPHOCYTES NFR BLD: 28 %
MCH RBC QN AUTO: 30.9 PG (ref 26–34)
MCHC RBC AUTO-ENTMCNC: 33.6 G/DL (ref 32–36.5)
MCV RBC AUTO: 92.1 FL (ref 78–100)
MONOCYTES # BLD: 0.4 K/MCL (ref 0.3–0.9)
MONOCYTES NFR BLD: 8 %
NEUTROPHILS # BLD: 3.2 K/MCL (ref 1.8–7.7)
NEUTROPHILS NFR BLD: 62 %
NRBC BLD MANUAL-RTO: 0 /100 WBC
PLATELET # BLD AUTO: 238 K/MCL (ref 140–450)
POTASSIUM SERPL-SCNC: 4.3 MMOL/L (ref 3.4–5.1)
PROT SERPL-MCNC: 7.3 G/DL (ref 6.4–8.2)
RBC # BLD: 4.53 MIL/MCL (ref 4–5.2)
SODIUM SERPL-SCNC: 138 MMOL/L (ref 135–145)
TSH SERPL-ACNC: 1.53 MCUNITS/ML (ref 0.35–5)
WBC # BLD: 5.1 K/MCL (ref 4.2–11)

## 2024-05-22 PROCEDURE — 86140 C-REACTIVE PROTEIN: CPT | Performed by: CLINICAL MEDICAL LABORATORY

## 2024-05-22 PROCEDURE — 86364 TISS TRNSGLTMNASE EA IG CLAS: CPT | Performed by: CLINICAL MEDICAL LABORATORY

## 2024-05-22 PROCEDURE — 36415 COLL VENOUS BLD VENIPUNCTURE: CPT

## 2024-05-22 PROCEDURE — 80050 GENERAL HEALTH PANEL: CPT | Performed by: INTERNAL MEDICINE

## 2024-05-22 PROCEDURE — 82784 ASSAY IGA/IGD/IGG/IGM EACH: CPT | Performed by: CLINICAL MEDICAL LABORATORY

## 2024-05-23 ENCOUNTER — LAB SERVICES (OUTPATIENT)
Dept: LAB | Age: 24
End: 2024-05-23

## 2024-05-23 DIAGNOSIS — R19.7 DIARRHEA, UNSPECIFIED TYPE: ICD-10-CM

## 2024-05-23 LAB — CRP SERPL-MCNC: <3 MG/L

## 2024-05-23 PROCEDURE — 82653 EL-1 FECAL QUANTITATIVE: CPT | Performed by: CLINICAL MEDICAL LABORATORY

## 2024-05-23 PROCEDURE — 87046 STOOL CULTR AEROBIC BACT EA: CPT | Performed by: CLINICAL MEDICAL LABORATORY

## 2024-05-23 PROCEDURE — 83993 ASSAY FOR CALPROTECTIN FECAL: CPT | Performed by: CLINICAL MEDICAL LABORATORY

## 2024-05-23 PROCEDURE — 87449 NOS EACH ORGANISM AG IA: CPT | Performed by: CLINICAL MEDICAL LABORATORY

## 2024-05-23 PROCEDURE — 87045 FECES CULTURE AEROBIC BACT: CPT | Performed by: CLINICAL MEDICAL LABORATORY

## 2024-05-23 PROCEDURE — 87427 SHIGA-LIKE TOXIN AG IA: CPT | Performed by: CLINICAL MEDICAL LABORATORY

## 2024-05-23 RX ORDER — BISACODYL 5 MG/1
TABLET, DELAYED RELEASE ORAL
Qty: 2 TABLET | Refills: 0 | Status: SHIPPED | OUTPATIENT
Start: 2024-05-23 | End: 2024-07-07

## 2024-05-23 RX ORDER — SOD SULF/POT CHLORIDE/MAG SULF 1.479 G
12 TABLET ORAL SEE ADMIN INSTRUCTIONS
Qty: 24 TABLET | Refills: 0 | Status: SHIPPED | OUTPATIENT
Start: 2024-05-23

## 2024-05-23 RX ORDER — SIMETHICONE 125 MG
TABLET,CHEWABLE ORAL
Qty: 2 TABLET | Refills: 0 | Status: SHIPPED | OUTPATIENT
Start: 2024-05-23 | End: 2024-07-07

## 2024-05-24 LAB
C JEJUNI+C COLI AG STL QL: NORMAL
IGA SERPL-MCNC: 194 MG/DL (ref 70–400)
TTG IGA SER IA-ACNC: 1 U/ML

## 2024-05-25 LAB
BACTERIA STL CULT: NORMAL
E COLI SHIGA-LIKE TOXIN 1+2 STL QL IA: NORMAL
ELASTASE PANC STL-MCNT: 320 UG/G

## 2024-05-26 LAB — O+P STL MICRO: NEGATIVE

## 2024-05-28 ENCOUNTER — TELEPHONE (OUTPATIENT)
Dept: GASTROENTEROLOGY | Age: 24
End: 2024-05-28

## 2024-05-28 ENCOUNTER — ANESTHESIA EVENT (OUTPATIENT)
Dept: GASTROENTEROLOGY | Age: 24
End: 2024-05-28

## 2024-05-28 LAB — CALPROTECTIN STL-MCNT: 30 UG/G

## 2024-05-29 ENCOUNTER — ANESTHESIA (OUTPATIENT)
Dept: GASTROENTEROLOGY | Age: 24
End: 2024-05-29

## 2024-05-29 ENCOUNTER — APPOINTMENT (OUTPATIENT)
Dept: GASTROENTEROLOGY | Age: 24
End: 2024-05-29
Attending: INTERNAL MEDICINE

## 2024-05-29 VITALS
HEART RATE: 85 BPM | TEMPERATURE: 97 F | RESPIRATION RATE: 16 BRPM | BODY MASS INDEX: 32.58 KG/M2 | SYSTOLIC BLOOD PRESSURE: 138 MMHG | WEIGHT: 215 LBS | DIASTOLIC BLOOD PRESSURE: 93 MMHG | HEIGHT: 68 IN | OXYGEN SATURATION: 100 %

## 2024-05-29 DIAGNOSIS — K64.9 HEMORRHOIDS, UNSPECIFIED HEMORRHOID TYPE: ICD-10-CM

## 2024-05-29 DIAGNOSIS — R19.7 DIARRHEA, UNSPECIFIED TYPE: ICD-10-CM

## 2024-05-29 LAB
B-HCG UR QL: NEGATIVE
INTERNAL PROCEDURAL CONTROLS ACCEPTABLE: YES
TEST LOT EXPIRATION DATE: NORMAL
TEST LOT NUMBER: NORMAL

## 2024-05-29 PROCEDURE — 45380 COLONOSCOPY AND BIOPSY: CPT | Performed by: INTERNAL MEDICINE

## 2024-05-29 RX ORDER — SODIUM CHLORIDE 9 MG/ML
INJECTION, SOLUTION INTRAVENOUS CONTINUOUS
Status: DISCONTINUED | OUTPATIENT
Start: 2024-05-29 | End: 2024-05-31 | Stop reason: HOSPADM

## 2024-05-29 RX ORDER — DEXTROSE MONOHYDRATE 50 MG/ML
INJECTION, SOLUTION INTRAVENOUS CONTINUOUS PRN
Status: DISCONTINUED | OUTPATIENT
Start: 2024-05-29 | End: 2024-05-31 | Stop reason: HOSPADM

## 2024-05-29 RX ORDER — LIDOCAINE HYDROCHLORIDE 10 MG/ML
INJECTION, SOLUTION INFILTRATION; PERINEURAL PRN
Status: DISCONTINUED | OUTPATIENT
Start: 2024-05-29 | End: 2024-05-29

## 2024-05-29 RX ORDER — LIDOCAINE HYDROCHLORIDE 10 MG/ML
5 INJECTION, SOLUTION INFILTRATION; PERINEURAL PRN
Status: DISCONTINUED | OUTPATIENT
Start: 2024-05-29 | End: 2024-05-31 | Stop reason: HOSPADM

## 2024-05-29 RX ORDER — NICOTINE POLACRILEX 4 MG
30 LOZENGE BUCCAL
Status: DISCONTINUED | OUTPATIENT
Start: 2024-05-29 | End: 2024-05-31 | Stop reason: HOSPADM

## 2024-05-29 RX ORDER — PROPOFOL 10 MG/ML
INJECTION, EMULSION INTRAVENOUS PRN
Status: DISCONTINUED | OUTPATIENT
Start: 2024-05-29 | End: 2024-05-29

## 2024-05-29 RX ORDER — SODIUM CHLORIDE, SODIUM LACTATE, POTASSIUM CHLORIDE, CALCIUM CHLORIDE 600; 310; 30; 20 MG/100ML; MG/100ML; MG/100ML; MG/100ML
INJECTION, SOLUTION INTRAVENOUS CONTINUOUS
Status: DISCONTINUED | OUTPATIENT
Start: 2024-05-29 | End: 2024-05-31 | Stop reason: HOSPADM

## 2024-05-29 RX ORDER — DEXTROSE MONOHYDRATE 25 G/50ML
25 INJECTION, SOLUTION INTRAVENOUS PRN
Status: DISCONTINUED | OUTPATIENT
Start: 2024-05-29 | End: 2024-05-31 | Stop reason: HOSPADM

## 2024-05-29 RX ADMIN — SODIUM CHLORIDE, SODIUM LACTATE, POTASSIUM CHLORIDE, CALCIUM CHLORIDE: 600; 310; 30; 20 INJECTION, SOLUTION INTRAVENOUS at 12:51

## 2024-05-29 RX ADMIN — PROPOFOL 150 MG: 10 INJECTION, EMULSION INTRAVENOUS at 13:17

## 2024-05-29 RX ADMIN — PROPOFOL 100 MG: 10 INJECTION, EMULSION INTRAVENOUS at 13:11

## 2024-05-29 RX ADMIN — PROPOFOL 100 MG: 10 INJECTION, EMULSION INTRAVENOUS at 13:04

## 2024-05-29 RX ADMIN — PROPOFOL 100 MG: 10 INJECTION, EMULSION INTRAVENOUS at 12:58

## 2024-05-29 RX ADMIN — LIDOCAINE HYDROCHLORIDE 50 MG: 10 INJECTION, SOLUTION INFILTRATION; PERINEURAL at 12:58

## 2024-05-29 SDOH — SOCIAL STABILITY: SOCIAL INSECURITY: RISK FACTORS: NEUROLOGICAL DISEASE

## 2024-05-29 SDOH — SOCIAL STABILITY: SOCIAL INSECURITY: RISK FACTORS: AGE

## 2024-05-29 SDOH — SOCIAL STABILITY: SOCIAL INSECURITY: RISK FACTORS: BMI> 30 (OBESITY)

## 2024-05-29 ASSESSMENT — ACTIVITIES OF DAILY LIVING (ADL)
NEEDS_ASSIST: NO
ADL_SHORT_OF_BREATH: NO
ADL_SCORE: 12
CHRONIC_PAIN_PRESENT: NO
RECENT_DECLINE_ADL: NO
ADL_BEFORE_ADMISSION: INDEPENDENT

## 2024-05-29 ASSESSMENT — PAIN SCALES - GENERAL
PAINLEVEL_OUTOF10: 0

## 2024-05-29 ASSESSMENT — LIFESTYLE VARIABLES: SMOKING_STATUS: CURRENT

## 2024-05-30 ENCOUNTER — OFFICE VISIT (OUTPATIENT)
Dept: FAMILY MEDICINE CLINIC | Facility: CLINIC | Age: 24
End: 2024-05-30

## 2024-05-30 VITALS
RESPIRATION RATE: 18 BRPM | DIASTOLIC BLOOD PRESSURE: 78 MMHG | WEIGHT: 216.19 LBS | BODY MASS INDEX: 34.33 KG/M2 | HEART RATE: 87 BPM | SYSTOLIC BLOOD PRESSURE: 118 MMHG | OXYGEN SATURATION: 97 % | HEIGHT: 66.54 IN | TEMPERATURE: 97 F

## 2024-05-30 DIAGNOSIS — F17.200 VAPING NICOTINE DEPENDENCE, NON-TOBACCO PRODUCT: ICD-10-CM

## 2024-05-30 DIAGNOSIS — Z00.00 WELL ADULT EXAM: Primary | ICD-10-CM

## 2024-05-30 PROBLEM — R19.7 DIARRHEA: Status: ACTIVE | Noted: 2024-05-21

## 2024-05-30 LAB
ALBUMIN SERPL-MCNC: 4 G/DL (ref 3.4–5)
ALBUMIN/GLOB SERPL: 1.1 {RATIO} (ref 1–2)
ALP LIVER SERPL-CCNC: 76 U/L
ALT SERPL-CCNC: 24 U/L
ANION GAP SERPL CALC-SCNC: 8 MMOL/L (ref 0–18)
AST SERPL-CCNC: 35 U/L (ref 15–37)
BASOPHILS # BLD AUTO: 0.03 X10(3) UL (ref 0–0.2)
BASOPHILS NFR BLD AUTO: 0.5 %
BILIRUB SERPL-MCNC: 0.9 MG/DL (ref 0.1–2)
BUN BLD-MCNC: 14 MG/DL (ref 9–23)
CALCIUM BLD-MCNC: 8.7 MG/DL (ref 8.5–10.1)
CHLORIDE SERPL-SCNC: 112 MMOL/L (ref 98–112)
CHOLEST SERPL-MCNC: 150 MG/DL (ref ?–200)
CO2 SERPL-SCNC: 17 MMOL/L (ref 21–32)
CREAT BLD-MCNC: 0.92 MG/DL
EGFRCR SERPLBLD CKD-EPI 2021: 89 ML/MIN/1.73M2 (ref 60–?)
EOSINOPHIL # BLD AUTO: 0.09 X10(3) UL (ref 0–0.7)
EOSINOPHIL NFR BLD AUTO: 1.5 %
ERYTHROCYTE [DISTWIDTH] IN BLOOD BY AUTOMATED COUNT: 12.5 %
FASTING PATIENT LIPID ANSWER: YES
FASTING STATUS PATIENT QL REPORTED: YES
GLOBULIN PLAS-MCNC: 3.7 G/DL (ref 2.8–4.4)
GLUCOSE BLD-MCNC: 86 MG/DL (ref 70–99)
HCT VFR BLD AUTO: 41.8 %
HDLC SERPL-MCNC: 55 MG/DL (ref 40–59)
HGB BLD-MCNC: 14.7 G/DL
IMM GRANULOCYTES # BLD AUTO: 0.01 X10(3) UL (ref 0–1)
IMM GRANULOCYTES NFR BLD: 0.2 %
LDLC SERPL CALC-MCNC: 87 MG/DL (ref ?–100)
LYMPHOCYTES # BLD AUTO: 1.05 X10(3) UL (ref 1–4)
LYMPHOCYTES NFR BLD AUTO: 17.9 %
MCH RBC QN AUTO: 31.3 PG (ref 26–34)
MCHC RBC AUTO-ENTMCNC: 35.2 G/DL (ref 31–37)
MCV RBC AUTO: 88.9 FL
MONOCYTES # BLD AUTO: 0.65 X10(3) UL (ref 0.1–1)
MONOCYTES NFR BLD AUTO: 11.1 %
NEUTROPHILS # BLD AUTO: 4.04 X10 (3) UL (ref 1.5–7.7)
NEUTROPHILS # BLD AUTO: 4.04 X10(3) UL (ref 1.5–7.7)
NEUTROPHILS NFR BLD AUTO: 68.8 %
NONHDLC SERPL-MCNC: 95 MG/DL (ref ?–130)
OSMOLALITY SERPL CALC.SUM OF ELEC: 284 MOSM/KG (ref 275–295)
PLATELET # BLD AUTO: 268 10(3)UL (ref 150–450)
POTASSIUM SERPL-SCNC: 4.6 MMOL/L (ref 3.5–5.1)
PROT SERPL-MCNC: 7.7 G/DL (ref 6.4–8.2)
RBC # BLD AUTO: 4.7 X10(6)UL
SODIUM SERPL-SCNC: 137 MMOL/L (ref 136–145)
T4 FREE SERPL-MCNC: 1 NG/DL (ref 0.8–1.7)
TRIGL SERPL-MCNC: 35 MG/DL (ref 30–149)
TSI SER-ACNC: 0.79 MIU/ML (ref 0.36–3.74)
VLDLC SERPL CALC-MCNC: 6 MG/DL (ref 0–30)
WBC # BLD AUTO: 5.9 X10(3) UL (ref 4–11)

## 2024-05-30 PROCEDURE — 99395 PREV VISIT EST AGE 18-39: CPT | Performed by: FAMILY MEDICINE

## 2024-05-30 PROCEDURE — 84439 ASSAY OF FREE THYROXINE: CPT | Performed by: FAMILY MEDICINE

## 2024-05-30 PROCEDURE — 80050 GENERAL HEALTH PANEL: CPT | Performed by: FAMILY MEDICINE

## 2024-05-30 PROCEDURE — 3078F DIAST BP <80 MM HG: CPT | Performed by: FAMILY MEDICINE

## 2024-05-30 PROCEDURE — 3008F BODY MASS INDEX DOCD: CPT | Performed by: FAMILY MEDICINE

## 2024-05-30 PROCEDURE — 3074F SYST BP LT 130 MM HG: CPT | Performed by: FAMILY MEDICINE

## 2024-05-30 PROCEDURE — 80061 LIPID PANEL: CPT | Performed by: FAMILY MEDICINE

## 2024-05-30 NOTE — PROGRESS NOTES
Chief Complaint   Patient presents with    Well Adult     Pt is fasting         HPI  Pt is her for wellness.  She is sleeping nicotine but only.  Again when she needs a break from work or taking a time out from her son.  She really only uses it intermittently and 1 disposable device is lasted her over a month.  Patient encouraged to just quit smoking and use behavioral therapy to try and manage her anxiety and stress.  Patient is reluctant to go for counseling but is seeing a psychiatrist and will discuss this with them further.    PAP is UTD and she is due to see Ob/Gyn for this and breast exam    ROS  As per HPI and all other systems reviewed and are negative      Past Medical History:    Anxiety    Arrest of dilation, delivered, current hospitalization (HCC)    Last Assessment & Plan:  Formatting of this note might be different from the original. - Patient remained 6 cm after 6 hours of inadequate contractions    Benign neoplasm of skin of trunk, except scrotum    HERB (generalized anxiety disorder)    Mononucleosis syndrome    Nexplanon in place    Nexplanon placed 17    Obsessive thinking    Panic attacks    Postpartum anxiety (HCC)    Preeclampsia, severe (HCC)    Last Assessment & Plan:  Formatting of this note might be different from the original. Started on nifedipine 30 mg on POD#1    Traumatic birth    Visual impairment    glasses    Vitamin D deficiency       Past Surgical History:   Procedure Laterality Date          Other surgical history      wisdom teeth    Other surgical history  2023    Excision of pilar cyst       Social History     Socioeconomic History    Marital status: Single   Tobacco Use    Smoking status: Never    Smokeless tobacco: Never    Tobacco comments:     non-smoker   Vaping Use    Vaping status: Some Days    Substances: Nicotine, Flavoring    Devices: Disposable, Pre-filled or refillable cartridge   Substance and Sexual Activity    Alcohol use: Yes     Comment:  soc, 2x month    Drug use: Not Currently     Types: Cannabis       Family History   Problem Relation Age of Onset    Anxiety Mother     Heart Disease Maternal Grandfather     Stroke Paternal Grandfather         Current Outpatient Medications on File Prior to Visit   Medication Sig Dispense Refill    Probiotic Product (PROBIOTIC BLEND OR) Take by mouth.      clonazePAM 0.5 MG Oral Tab Take 2 tablets (1 mg total) by mouth 2 (two) times daily. 120 tablet 0    busPIRone 10 MG Oral Tab Take 2 tablets (20 mg total) by mouth at bedtime. 180 tablet 1    desvenlafaxine ER (PRISTIQ) 50 MG Oral Tablet 24 Hr Take 1 tablet (50 mg total) by mouth daily. W 25 mg for 75 mg/day 90 tablet 1    desvenlafaxine ER 25 MG Oral Tablet 24 Hr Take 1 tablet (25 mg total) by mouth daily. W 50 mg for 75 mg/day 90 tablet 1    desvenlafaxine ER 50 MG Oral Tablet 24 Hr Take 1 tablet (50 mg total) by mouth daily. W 25 mg for 75 mg daily 90 tablet 1    busPIRone 10 MG Oral Tab Take 2 tablets (20 mg total) by mouth at bedtime. 180 tablet 1    desvenlafaxine ER 25 MG Oral Tablet 24 Hr Take 1 tablet (25 mg total) by mouth daily. Along w 50 mg for total 75 mg/day. 90 tablet 1    medroxyPROGESTERone acetate 104 MG/0.65ML Subcutaneous Suspension Prefilled Syringe Inject 0.65 mL (104 mg total) into the skin once.      clonazePAM (KLONOPIN) 1 MG Oral Tab Take 1 tablet (1 mg total) by mouth 2 (two) times daily. (Patient not taking: Reported on 5/30/2024) 60 tablet 5     No current facility-administered medications on file prior to visit.         Objective  Vitals:    05/30/24 0930   BP: 118/78   Pulse: 87   Resp: 18   Temp: 97.4 °F (36.3 °C)   SpO2: 97%   Weight: 216 lb 3.2 oz (98.1 kg)   Height: 5' 6.54\" (1.69 m)     Physical Exam  Constitutional:       Appearance: Normal appearance.   HEENT:      Head: Normocephalic and atraumatic.      Eyes: PERRLA no notable nystagmus     Ears: normal on observation     Nose: Nose normal.      Mouth: Mucous membranes are  moist.      Neck: no masses no bruit  Cardiovascular:      Rate and Rhythm: Normal rate and regular rhythm.   Pulmonary:      Effort: Pulmonary effort is normal.      Breath sounds: Normal breath sounds.   Abdominal:      General: Bowel sounds are normal.      Palpations: Abdomen is soft. There is no mass.   Musculoskeletal:         General: Normal range of motion.      Cervical back: Normal range of motion.   Skin:     General: Skin is warm and dry.   Neurological:      General: No focal deficit present.      Mental Status: She is alert and oriented to person, place, and time.   Psychiatric:         Mood and Affect: Mood normal.         Thought Content: Thought content normal.       Assessment and Plan  Noemi was seen today for well adult.    Diagnoses and all orders for this visit:    Well adult exam  -     CBC With Differential With Platelet; Future  -     Comp Metabolic Panel (14); Future  -     TSH and Free T4; Future  -     Lipid Panel; Future  -     CBC With Differential With Platelet  -     Comp Metabolic Panel (14)  -     TSH and Free T4  -     Lipid Panel    Vaping nicotine dependence, non-tobacco product       Pt is going to try and quit on her own. One vape last longer than a month    Follow up  No follow-ups on file.      Patient Instructions  There are no Patient Instructions on file for this visit.       Wilmer Moralez MD

## 2024-05-31 LAB
ASR DISCLAIMER: NORMAL
CASE RPRT: NORMAL
CLINICAL INFO: NORMAL
PATH REPORT.FINAL DX SPEC: NORMAL
PATH REPORT.GROSS SPEC: NORMAL

## 2024-06-07 ENCOUNTER — TELEPHONE (OUTPATIENT)
Dept: GASTROENTEROLOGY | Age: 24
End: 2024-06-07

## 2024-06-07 RX ORDER — DICYCLOMINE HYDROCHLORIDE 10 MG/1
10 CAPSULE ORAL 2 TIMES DAILY
Qty: 60 CAPSULE | Refills: 3 | Status: SHIPPED | OUTPATIENT
Start: 2024-06-07

## 2024-07-20 ENCOUNTER — TELEPHONE (OUTPATIENT)
Dept: FAMILY MEDICINE CLINIC | Facility: CLINIC | Age: 24
End: 2024-07-20

## 2024-07-20 NOTE — TELEPHONE ENCOUNTER
Per Dr Corrigan, if patient is symptom free and meets employer guidelines she may return to work.     Spoke with patient, advised note above. Patient verbally understood

## 2024-07-20 NOTE — TELEPHONE ENCOUNTER
Symptomatic management with otc cough medicine, fluids rest, minimize exposure to outside world until symptoms improve,

## 2024-07-20 NOTE — TELEPHONE ENCOUNTER
Pt tested positive for Covid yesterday.  Symptoms since Wednesday.  Whole family has had it.  When can she go out in public?  Pt has no fever.  Please advise.  Thank you!   63.5

## 2024-08-23 ENCOUNTER — APPOINTMENT (OUTPATIENT)
Dept: GENERAL RADIOLOGY | Age: 24
End: 2024-08-23
Attending: NURSE PRACTITIONER
Payer: COMMERCIAL

## 2024-08-23 ENCOUNTER — HOSPITAL ENCOUNTER (OUTPATIENT)
Age: 24
Discharge: HOME | End: 2024-08-23
Payer: COMMERCIAL

## 2024-08-23 VITALS
HEART RATE: 98 BPM | RESPIRATION RATE: 18 BRPM | DIASTOLIC BLOOD PRESSURE: 95 MMHG | WEIGHT: 220 LBS | OXYGEN SATURATION: 98 % | BODY MASS INDEX: 35 KG/M2 | TEMPERATURE: 98 F | SYSTOLIC BLOOD PRESSURE: 135 MMHG

## 2024-08-23 DIAGNOSIS — J06.9 VIRAL URI WITH COUGH: Primary | ICD-10-CM

## 2024-08-23 DIAGNOSIS — R05.1 ACUTE COUGH: ICD-10-CM

## 2024-08-23 LAB — S PYO AG THROAT QL: NEGATIVE

## 2024-08-23 PROCEDURE — 99214 OFFICE O/P EST MOD 30 MIN: CPT | Performed by: NURSE PRACTITIONER

## 2024-08-23 PROCEDURE — 87880 STREP A ASSAY W/OPTIC: CPT | Performed by: NURSE PRACTITIONER

## 2024-08-23 PROCEDURE — 71046 X-RAY EXAM CHEST 2 VIEWS: CPT | Performed by: NURSE PRACTITIONER

## 2024-08-23 RX ORDER — GUAIFENESIN 600 MG/1
1200 TABLET, EXTENDED RELEASE ORAL 2 TIMES DAILY
Qty: 20 TABLET | Refills: 0 | Status: SHIPPED | OUTPATIENT
Start: 2024-08-23 | End: 2024-08-28

## 2024-08-23 RX ORDER — BENZONATATE 100 MG/1
100 CAPSULE ORAL 3 TIMES DAILY PRN
Qty: 30 CAPSULE | Refills: 0 | Status: SHIPPED | OUTPATIENT
Start: 2024-08-23 | End: 2024-09-22

## 2024-08-24 NOTE — DISCHARGE INSTRUCTIONS
Drink plenty of fluids.   Mucinex as directed   Tessalon: 1 tab up to three times a day IF NEEDED for coughing.   Tylenol or Ibuprofen as directed for fever.   Salt water gargles for sore throat.     If any worsening symptoms in the next 3-5 days follow up with PCP.

## 2024-08-25 NOTE — ED PROVIDER NOTES
Patient Seen in: [facility]      History     Chief Complaint   Patient presents with    Cough    Fever    Nasal Congestion    Sore Throat     Stated Complaint: fever cough sob sinus    Subjective:   25 yo female presents with complaint of fever (102), sore throat and cough x 3 days.   Had Covid 3 weeks ago.   No OTC's taken.     Pt denies nasal congestion, difficulty swallowing, shortness of breath, nausea, vomiting or diarrhea.      The history is provided by the patient.           Objective:   Past Medical History:    Anxiety    Arrest of dilation, delivered, current hospitalization (HCC)    Last Assessment & Plan:  Formatting of this note might be different from the original. - Patient remained 6 cm after 6 hours of inadequate contractions    Benign neoplasm of skin of trunk, except scrotum    HERB (generalized anxiety disorder)    Mononucleosis syndrome    Nexplanon in place    Nexplanon placed 17    Obsessive thinking    Panic attacks    Postpartum anxiety (HCC)    Preeclampsia, severe (HCC)    Last Assessment & Plan:  Formatting of this note might be different from the original. Started on nifedipine 30 mg on POD#1    Traumatic birth    Visual impairment    glasses    Vitamin D deficiency              Past Surgical History:   Procedure Laterality Date          Other surgical history      wisdom teeth    Other surgical history  2023    Excision of pilar cyst                Social History     Socioeconomic History    Marital status: Single   Tobacco Use    Smoking status: Never    Smokeless tobacco: Never    Tobacco comments:     non-smoker   Vaping Use    Vaping status: Some Days    Substances: Nicotine, Flavoring    Devices: Disposable, Pre-filled or refillable cartridge   Substance and Sexual Activity    Alcohol use: Yes     Comment: soc, 2x month    Drug use: Not Currently     Types: Cannabis     Social Determinants of Health     Financial Resource Strain: Not At Risk  (11/18/2022)    [facility]    Financial Resource Strain     How hard is it for you to pay for the very basics like food, housing, medical care, and heating?: Not hard at all   Food Insecurity: No Food Insecurity (4/26/2024)    Received from [facility]    Food Insecurity     Currently or in the past 3 months, have you worried your food would run out before you had money to buy more?: No     In the past 12 months, have you run out of food or been unable to get more?: No   Transportation Needs: No Transportation Needs (11/18/2022)    [facility]    Transportation Needs     Medical Transportation Needs?: Patient refused     Daily Living Transportation Needs? [Peds Only] : Patient refused    [facility] from [facility]    Social Connections    Received from [facility]    Housing Stability              Review of Systems   Constitutional:  Positive for fever. Negative for chills and diaphoresis.   HENT:  Positive for sore throat. Negative for congestion.    Respiratory:  Positive for cough. Negative for shortness of breath and wheezing.    Cardiovascular:  Negative for chest pain.   Gastrointestinal:  Negative for diarrhea, nausea and vomiting.       Positive for stated Chief Complaint: Cough, Fever, Nasal Congestion, and Sore Throat    Other systems are as noted in HPI.  Constitutional and vital signs reviewed.      All other systems reviewed and negative except as noted above.    Physical Exam     ED Triage Vitals [08/23/24 1832]   BP (!) 143/105   Pulse 98   Resp 18   Temp 98.2 °F (36.8 °C)   Temp src Temporal   SpO2 98 %   O2 Device None (Room air)       Current Vitals:   No data recorded        Physical Exam  Constitutional:       Appearance: She is well-developed. She  is not ill-appearing or diaphoretic.   HENT:      Right Ear: Tympanic membrane normal.      Left Ear: Tympanic membrane normal.      Mouth/Throat:      Mouth: Mucous membranes are moist.      Pharynx: Uvula midline. Posterior oropharyngeal erythema present. No oropharyngeal exudate or uvula swelling.      Tonsils: No tonsillar exudate or tonsillar abscesses.   Eyes:      Conjunctiva/sclera: Conjunctivae normal.   Cardiovascular:      Rate and Rhythm: Normal rate and regular rhythm.      Heart sounds: No murmur heard.  Pulmonary:      Effort: Pulmonary effort is normal.      Breath sounds: Rhonchi present.   Musculoskeletal:      Cervical back: Normal range of motion and neck supple.   Skin:     General: Skin is warm and dry.   Neurological:      Mental Status: She is alert.   Psychiatric:         Mood and Affect: Mood normal.               ED Course     Labs Reviewed   POCT RAPID STREP - Normal     POCT Rapid Strep        Component Value Flag Ref Range Units Status    POCT Rapid Strep Negative      Negative  Final                  XR CHEST PA + LAT CHEST (CPT=71046)          PROCEDURE:  XR CHEST PA + LAT CHEST (CPT=71046)     INDICATIONS:  cough/fever     COMPARISON:  [facility], XR, XR CHEST PA + LAT CHEST (CPT=71046), 10/23/2019, 3:59 PM.     TECHNIQUE:  PA and lateral chest radiographs were obtained.     PATIENT STATED HISTORY: (As transcribed by Technologist)  Patient with fever, sore throat and cough for 2 days. Patient had Covid mid July.          FINDINGS:  Normal heart size and pulmonary vascularity. No pleural effusion or pneumothorax. No lobar consolidation.                   =====  CONCLUSION:  No active cardiopulmonary process identified.        LOCATION:  [id number]        Dictated by (CST): Shahzad Sheppard MD on 8/23/2024 at 7:11 PM       Finalized by (CST): Shahzad Sheppard MD on 8/23/2024 at 7:12 PM                             MDM                    Medical Decision Making  25 yo female  presents with complaint of fever (102), sore throat and cough x 3 days.   Diff dx include strep vs pna vs bronchitis.   On exam, pt is well appearing, normal vitals.   Strep negative.   Performed xray since recently had Covid; no acute findings.   Supportive care discussed.   Return precautions emphasized for any persistent or worsening of symptoms.   Pt agreeable to plan.     Amount and/or Complexity of Data Reviewed  External Data Reviewed: labs and notes.  Labs: ordered.  Radiology: ordered.    Risk  OTC drugs.  Prescription drug management.        Disposition and Plan     Clinical Impression:  1. Viral URI with cough    2. Acute cough         Disposition:  Discharge  8/23/2024  7:21 pm    Follow-up:  Wilmer Moralez MD  [address] [facility] [address]  [phone]    Schedule an appointment as soon as possible for a visit in 3 days  If symptoms worsen          Medications Prescribed:  Discharge Medication List as of 8/23/2024  7:26 PM        START taking these medications    Details   guaiFENesin ER (MUCINEX) 600 MG Oral Tablet 12 Hr Take 2 tablets (1,200 mg total) by mouth 2 (two) times daily for 5 days., Normal, Disp-20 tablet, R-0      benzonatate 100 MG Oral Cap Take 1 capsule (100 mg total) by mouth 3 (three) times daily as needed for cough., Normal, Disp-30 capsule, R-0

## 2024-08-26 ENCOUNTER — APPOINTMENT (OUTPATIENT)
Dept: GASTROENTEROLOGY | Age: 24
End: 2024-08-26

## 2024-08-26 VITALS — WEIGHT: 218 LBS | HEIGHT: 68 IN | BODY MASS INDEX: 33.04 KG/M2

## 2024-08-26 DIAGNOSIS — R10.13 DYSPEPSIA: Primary | ICD-10-CM

## 2024-08-26 DIAGNOSIS — R19.7 DIARRHEA, UNSPECIFIED TYPE: ICD-10-CM

## 2024-08-26 PROCEDURE — 99214 OFFICE O/P EST MOD 30 MIN: CPT

## 2024-08-26 RX ORDER — OMEPRAZOLE 40 MG/1
40 CAPSULE, DELAYED RELEASE ORAL DAILY
Qty: 30 CAPSULE | Refills: 3 | Status: SHIPPED | OUTPATIENT
Start: 2024-08-26 | End: 2024-12-24

## 2024-08-26 ASSESSMENT — ENCOUNTER SYMPTOMS
DIZZINESS: 0
TROUBLE SWALLOWING: 0
VOICE CHANGE: 0
BLOOD IN STOOL: 0
ABDOMINAL DISTENTION: 0
ABDOMINAL PAIN: 0
FATIGUE: 0
FEVER: 0
DIAPHORESIS: 0
ACTIVITY CHANGE: 0
WEAKNESS: 0
LIGHT-HEADEDNESS: 0
ANAL BLEEDING: 0
NAUSEA: 1
COUGH: 0
VOMITING: 0
CHILLS: 0
COLOR CHANGE: 0
CONSTIPATION: 0
DIARRHEA: 1
APPETITE CHANGE: 0
SHORTNESS OF BREATH: 0
UNEXPECTED WEIGHT CHANGE: 0
CONFUSION: 0

## 2024-09-04 ENCOUNTER — TELEPHONE (OUTPATIENT)
Dept: FAMILY MEDICINE CLINIC | Facility: CLINIC | Age: 24
End: 2024-09-04

## 2024-09-04 NOTE — TELEPHONE ENCOUNTER
Wilmer Moralez MD P Jeganmohan, Janandana Nurse  Check on pt and see if she wishes to follow up. Thank you          Previous Messages       ----- Message -----  From: Vika Gallegos ([facility])  Sent: 9/3/2024   1:20 AM CDT  To: Wilmer Moralez MD ([facility])  Subject: General Communication on 9/3/2024

## 2024-09-04 NOTE — TELEPHONE ENCOUNTER
Advised patient of Dr. Fierro's note below. Patient verbalized understanding and stated she is doing fine, has endoscopy scheduled for the 9th, will call office back if needs sooner appt. No further questions at this time.    Future Appointments   Date Time Provider Department Center   9/23/2024  8:00 AM Wilmer Moralez MD [facility] [place]

## 2024-09-06 ENCOUNTER — ANESTHESIA EVENT (OUTPATIENT)
Dept: GASTROENTEROLOGY | Age: 24
End: 2024-09-06

## 2024-09-09 ENCOUNTER — APPOINTMENT (OUTPATIENT)
Dept: GASTROENTEROLOGY | Age: 24
End: 2024-09-09
Attending: INTERNAL MEDICINE

## 2024-09-09 ENCOUNTER — ANESTHESIA (OUTPATIENT)
Dept: GASTROENTEROLOGY | Age: 24
End: 2024-09-09

## 2024-09-09 VITALS
TEMPERATURE: 98.1 F | HEIGHT: 68 IN | OXYGEN SATURATION: 98 % | HEART RATE: 74 BPM | BODY MASS INDEX: 33.34 KG/M2 | DIASTOLIC BLOOD PRESSURE: 81 MMHG | WEIGHT: 220 LBS | SYSTOLIC BLOOD PRESSURE: 150 MMHG | RESPIRATION RATE: 18 BRPM

## 2024-09-09 DIAGNOSIS — K25.3 GASTRIC EROSIONS, ACUTE: ICD-10-CM

## 2024-09-09 DIAGNOSIS — R19.7 DIARRHEA, UNSPECIFIED TYPE: ICD-10-CM

## 2024-09-09 DIAGNOSIS — R10.13 DYSPEPSIA: ICD-10-CM

## 2024-09-09 PROCEDURE — 43239 EGD BIOPSY SINGLE/MULTIPLE: CPT | Performed by: INTERNAL MEDICINE

## 2024-09-09 PROCEDURE — 43239 EGD BIOPSY SINGLE/MULTIPLE: CPT | Performed by: CLINIC/CENTER

## 2024-09-09 RX ORDER — LIDOCAINE HYDROCHLORIDE 10 MG/ML
INJECTION, SOLUTION INFILTRATION; PERINEURAL PRN
Status: DISCONTINUED | OUTPATIENT
Start: 2024-09-09 | End: 2024-09-09

## 2024-09-09 RX ORDER — SODIUM CHLORIDE 9 MG/ML
INJECTION, SOLUTION INTRAVENOUS CONTINUOUS
Status: DISCONTINUED | OUTPATIENT
Start: 2024-09-09 | End: 2024-09-11 | Stop reason: HOSPADM

## 2024-09-09 RX ORDER — DEXTROSE MONOHYDRATE 50 MG/ML
INJECTION, SOLUTION INTRAVENOUS CONTINUOUS PRN
Status: DISCONTINUED | OUTPATIENT
Start: 2024-09-09 | End: 2024-09-11 | Stop reason: HOSPADM

## 2024-09-09 RX ORDER — DEXTROSE MONOHYDRATE 25 G/50ML
25 INJECTION, SOLUTION INTRAVENOUS PRN
Status: DISCONTINUED | OUTPATIENT
Start: 2024-09-09 | End: 2024-09-11 | Stop reason: HOSPADM

## 2024-09-09 RX ORDER — PROPOFOL 10 MG/ML
INJECTION, EMULSION INTRAVENOUS PRN
Status: DISCONTINUED | OUTPATIENT
Start: 2024-09-09 | End: 2024-09-09

## 2024-09-09 RX ORDER — NICOTINE POLACRILEX 4 MG
30 LOZENGE BUCCAL
Status: DISCONTINUED | OUTPATIENT
Start: 2024-09-09 | End: 2024-09-11 | Stop reason: HOSPADM

## 2024-09-09 RX ORDER — LIDOCAINE HYDROCHLORIDE 10 MG/ML
5 INJECTION, SOLUTION INFILTRATION; PERINEURAL PRN
Status: DISCONTINUED | OUTPATIENT
Start: 2024-09-09 | End: 2024-09-11 | Stop reason: HOSPADM

## 2024-09-09 RX ORDER — SODIUM CHLORIDE, SODIUM LACTATE, POTASSIUM CHLORIDE, CALCIUM CHLORIDE 600; 310; 30; 20 MG/100ML; MG/100ML; MG/100ML; MG/100ML
INJECTION, SOLUTION INTRAVENOUS CONTINUOUS
Status: DISCONTINUED | OUTPATIENT
Start: 2024-09-09 | End: 2024-09-11 | Stop reason: HOSPADM

## 2024-09-09 RX ADMIN — PROPOFOL 150 MG: 10 INJECTION, EMULSION INTRAVENOUS at 08:40

## 2024-09-09 RX ADMIN — LIDOCAINE HYDROCHLORIDE 50 MG: 10 INJECTION, SOLUTION INFILTRATION; PERINEURAL at 08:40

## 2024-09-09 RX ADMIN — SODIUM CHLORIDE, SODIUM LACTATE, POTASSIUM CHLORIDE, CALCIUM CHLORIDE: 600; 310; 30; 20 INJECTION, SOLUTION INTRAVENOUS at 07:59

## 2024-09-09 RX ADMIN — PROPOFOL 100 MG: 10 INJECTION, EMULSION INTRAVENOUS at 08:46

## 2024-09-09 SDOH — SOCIAL STABILITY: SOCIAL INSECURITY: RISK FACTORS: BMI> 30 (OBESITY)

## 2024-09-09 ASSESSMENT — PAIN SCALES - GENERAL
PAINLEVEL_OUTOF10: 0
PAINLEVEL_OUTOF10: 0

## 2024-09-09 ASSESSMENT — ACTIVITIES OF DAILY LIVING (ADL)
ADL_SCORE: 12
ADL_BEFORE_ADMISSION: INDEPENDENT

## 2024-09-09 ASSESSMENT — ENCOUNTER SYMPTOMS: EXERCISE TOLERANCE: GOOD (>4 METS)

## 2024-09-10 ENCOUNTER — MED REC SCAN ONLY (OUTPATIENT)
Dept: FAMILY MEDICINE CLINIC | Facility: CLINIC | Age: 24
End: 2024-09-10

## 2024-09-23 ENCOUNTER — OFFICE VISIT (OUTPATIENT)
Dept: FAMILY MEDICINE CLINIC | Facility: CLINIC | Age: 24
End: 2024-09-23
Payer: COMMERCIAL

## 2024-09-23 VITALS
WEIGHT: 221.38 LBS | OXYGEN SATURATION: 99 % | RESPIRATION RATE: 18 BRPM | DIASTOLIC BLOOD PRESSURE: 88 MMHG | TEMPERATURE: 98 F | SYSTOLIC BLOOD PRESSURE: 118 MMHG | HEIGHT: 66.54 IN | BODY MASS INDEX: 35.16 KG/M2 | HEART RATE: 71 BPM

## 2024-09-23 DIAGNOSIS — G47.30 SLEEP DISORDER BREATHING: ICD-10-CM

## 2024-09-23 DIAGNOSIS — R03.0 ELEVATED BLOOD PRESSURE READING: Primary | ICD-10-CM

## 2024-09-23 PROBLEM — R10.13 DYSPEPSIA: Status: ACTIVE | Noted: 2024-08-26

## 2024-09-23 LAB
ATRIAL RATE: 71 BPM
P AXIS: 51 DEGREES
P-R INTERVAL: 198 MS
Q-T INTERVAL: 374 MS
QRS DURATION: 90 MS
QTC CALCULATION (BEZET): 406 MS
R AXIS: 2 DEGREES
T AXIS: 24 DEGREES
VENTRICULAR RATE: 71 BPM

## 2024-09-23 PROCEDURE — 3008F BODY MASS INDEX DOCD: CPT | Performed by: FAMILY MEDICINE

## 2024-09-23 PROCEDURE — 93000 ELECTROCARDIOGRAM COMPLETE: CPT | Performed by: FAMILY MEDICINE

## 2024-09-23 PROCEDURE — 3079F DIAST BP 80-89 MM HG: CPT | Performed by: FAMILY MEDICINE

## 2024-09-23 PROCEDURE — 99213 OFFICE O/P EST LOW 20 MIN: CPT | Performed by: FAMILY MEDICINE

## 2024-09-23 PROCEDURE — 3074F SYST BP LT 130 MM HG: CPT | Performed by: FAMILY MEDICINE

## 2024-09-23 NOTE — PROGRESS NOTES
Chief Complaint   Patient presents with    Blood Pressure    Derm Problem     Pt has noticed  moles on abdomen         HPI  On several occasion whilst at the doctor her BP has been high but usually when she is going for a procedure. She does snore and agrees to sleep study and EKG    ROS  As per HPI and all other systems reviewed and are negative      Past Medical History:    Anxiety    Arrest of dilation, delivered, current hospitalization (HCC)    Last Assessment & Plan:  Formatting of this note might be different from the original. - Patient remained 6 cm after 6 hours of inadequate contractions    Benign neoplasm of skin of trunk, except scrotum    HERB (generalized anxiety disorder)    Mononucleosis syndrome    Nexplanon in place    Nexplanon placed 17    Obsessive thinking    Panic attacks    Postpartum anxiety (HCC)    Preeclampsia, severe (HCC)    Last Assessment & Plan:  Formatting of this note might be different from the original. Started on nifedipine 30 mg on POD#1    Traumatic birth    Visual impairment    glasses    Vitamin D deficiency       Past Surgical History:   Procedure Laterality Date          Other surgical history      wisdom teeth    Other surgical history  2023    Excision of pilar cyst       Social History     Socioeconomic History    Marital status: Single   Tobacco Use    Smoking status: Never     Passive exposure: Never    Smokeless tobacco: Never    Tobacco comments:     non-smoker   Vaping Use    Vaping status: Some Days    Substances: Nicotine, Flavoring    Devices: Disposable, Pre-filled or refillable cartridge   Substance and Sexual Activity    Alcohol use: Yes     Comment: soc, 2x month    Drug use: Not Currently     Types: Cannabis       Family History   Problem Relation Age of Onset    Anxiety Mother     Heart Disease Maternal Grandfather     Stroke Paternal Grandfather         Current Outpatient Medications on File Prior to Visit   Medication Sig Dispense  Refill    Levonorgestrel (MIRENA, 52 MG,) 20 MCG/DAY Intrauterine IUD 1 Intra Uterine Device by Intrauterine route one time.      Omeprazole 40 MG Oral Capsule Delayed Release Take 1 capsule (40 mg total) by mouth daily.      Bepotastine Besilate 1.5 % Ophthalmic Solution Place 1 drop into both eyes 2 (two) times daily.      CLONAZEPAM 0.5 MG Oral Tab TAKE 2 TABLETS(1 MG) BY MOUTH TWICE DAILY 120 tablet 0    busPIRone 10 MG Oral Tab Take 2 tablets (20 mg total) by mouth at bedtime. 180 tablet 1    dicyclomine 10 MG Oral Cap Take 1 capsule (10 mg total) by mouth 2 (two) times daily. (Patient not taking: Reported on 9/23/2024)      Probiotic Product (PROBIOTIC BLEND OR) Take by mouth. (Patient not taking: Reported on 9/23/2024)      clonazePAM (KLONOPIN) 1 MG Oral Tab Take 1 tablet (1 mg total) by mouth 2 (two) times daily. (Patient not taking: Reported on 9/23/2024) 60 tablet 5    busPIRone 10 MG Oral Tab Take 2 tablets (20 mg total) by mouth at bedtime. (Patient not taking: Reported on 9/23/2024) 180 tablet 1    desvenlafaxine ER (PRISTIQ) 50 MG Oral Tablet 24 Hr Take 1 tablet (50 mg total) by mouth daily. W 25 mg for 75 mg/day (Patient not taking: Reported on 9/23/2024) 90 tablet 1    desvenlafaxine ER 25 MG Oral Tablet 24 Hr Take 1 tablet (25 mg total) by mouth daily. W 50 mg for 75 mg/day (Patient not taking: Reported on 9/23/2024) 90 tablet 1    desvenlafaxine ER 50 MG Oral Tablet 24 Hr Take 1 tablet (50 mg total) by mouth daily. W 25 mg for 75 mg daily (Patient not taking: Reported on 9/23/2024) 90 tablet 1    desvenlafaxine ER 25 MG Oral Tablet 24 Hr Take 1 tablet (25 mg total) by mouth daily. Along w 50 mg for total 75 mg/day. (Patient not taking: Reported on 9/23/2024) 90 tablet 1    medroxyPROGESTERone acetate 104 MG/0.65ML Subcutaneous Suspension Prefilled Syringe Inject 0.65 mL (104 mg total) into the skin once. (Patient not taking: Reported on 8/23/2024)       No current facility-administered medications  on file prior to visit.         Objective  Vitals:    09/23/24 0754   BP: 118/88   Pulse: 71   Resp: 18   Temp: 98.2 °F (36.8 °C)   SpO2: 99%   Weight: 221 lb 6.4 oz (100.4 kg)   Height: 5' 6.54\" (1.69 m)     Physical Exam  Constitutional:       Appearance: Normal appearance.   HEENT:      Head: Normocephalic and atraumatic.      Eyes: PERRLA no notable nystagmus     Ears: normal on observation     Nose: Nose normal.      Mouth: Mucous membranes are moist.      Neck: no masses no bruit  Cardiovascular:      Rate and Rhythm: Normal rate and regular rhythm.   Pulmonary:      Effort: Pulmonary effort is normal.      Breath sounds: Normal breath sounds.   Musculoskeletal:         General: Normal range of motion.      Cervical back: Normal range of motion.   Skin:     General: Skin is warm and dry.   Neurological:      General: No focal deficit present.      Mental Status: She is alert and oriented to person, place, and time.   Psychiatric:         Mood and Affect: Mood normal.         Thought Content: Thought content normal.       Assessment and Plan  Noemi was seen today for blood pressure and derm problem.    Diagnoses and all orders for this visit:    Elevated blood pressure reading  -     Pulmonary Referral - [facility]  -     EKG with interpretation and Report -IN OFFICE [02714]    Sleep disorder breathing  -     Pulmonary Referral - [facility]           Follow up  No follow-ups on file.      Patient Instructions  There are no Patient Instructions on file for this visit.       Wilmer Moralez MD       This note was created by [email or web address] voice recognition. Errors in content may be related to improper recognition by the system; efforts to review and correct have been done but errors may still exist. Please be advised the primary purpose of this note is for me to communicate medical care. Standard sentence structure is not always used. Medical terminology and medical abbreviations may be used. There  may be grammatical, typographical, and automated fill ins that may have errors missed in proofreading.

## 2024-10-25 ENCOUNTER — OFFICE VISIT (OUTPATIENT)
Dept: GASTROENTEROLOGY | Age: 24
End: 2024-10-25

## 2024-10-25 VITALS — BODY MASS INDEX: 32.89 KG/M2 | HEIGHT: 68 IN | WEIGHT: 217.04 LBS

## 2024-10-25 DIAGNOSIS — K58.2 IRRITABLE BOWEL SYNDROME WITH BOTH CONSTIPATION AND DIARRHEA: Primary | ICD-10-CM

## 2024-10-25 DIAGNOSIS — K29.50 OTHER CHRONIC GASTRITIS WITHOUT HEMORRHAGE: ICD-10-CM

## 2024-10-25 DIAGNOSIS — K64.9 HEMORRHOIDS, UNSPECIFIED HEMORRHOID TYPE: ICD-10-CM

## 2024-10-25 PROBLEM — K29.70 GASTRITIS: Status: ACTIVE | Noted: 2024-08-26

## 2024-10-25 PROBLEM — K62.89 RECTAL PAIN: Status: ACTIVE | Noted: 2024-10-25

## 2024-10-25 RX ORDER — HYDROCORTISONE ACETATE 25 MG/1
25 SUPPOSITORY RECTAL 2 TIMES DAILY
Qty: 10 SUPPOSITORY | Refills: 0 | Status: SHIPPED | OUTPATIENT
Start: 2024-10-25 | End: 2024-10-30

## 2024-10-25 ASSESSMENT — ENCOUNTER SYMPTOMS
LIGHT-HEADEDNESS: 0
DIAPHORESIS: 0
COLOR CHANGE: 0
VOMITING: 0
ANAL BLEEDING: 0
CHILLS: 0
SHORTNESS OF BREATH: 0
DIZZINESS: 0
VOICE CHANGE: 0
ABDOMINAL PAIN: 0
BLOOD IN STOOL: 0
CONSTIPATION: 1
NAUSEA: 0
ABDOMINAL DISTENTION: 0
ACTIVITY CHANGE: 0
FEVER: 0
WEAKNESS: 0
FATIGUE: 0
DIARRHEA: 0
TROUBLE SWALLOWING: 0
APPETITE CHANGE: 0
RECTAL PAIN: 1
UNEXPECTED WEIGHT CHANGE: 0
CONFUSION: 0
COUGH: 0

## 2024-11-13 ENCOUNTER — HOSPITAL ENCOUNTER (OUTPATIENT)
Age: 24
Discharge: HOME | End: 2024-11-13
Payer: COMMERCIAL

## 2024-11-13 VITALS
DIASTOLIC BLOOD PRESSURE: 82 MMHG | HEART RATE: 85 BPM | OXYGEN SATURATION: 98 % | TEMPERATURE: 97 F | SYSTOLIC BLOOD PRESSURE: 122 MMHG | RESPIRATION RATE: 18 BRPM

## 2024-11-13 DIAGNOSIS — J01.90 ACUTE VIRAL SINUSITIS: Primary | ICD-10-CM

## 2024-11-13 DIAGNOSIS — B97.89 ACUTE VIRAL SINUSITIS: Primary | ICD-10-CM

## 2024-11-13 PROCEDURE — 99214 OFFICE O/P EST MOD 30 MIN: CPT | Performed by: NURSE PRACTITIONER

## 2024-11-13 RX ORDER — METHYLPREDNISOLONE 4 MG/1
TABLET ORAL
Qty: 1 EACH | Refills: 0 | Status: SHIPPED | OUTPATIENT
Start: 2024-11-13

## 2024-11-13 NOTE — ED INITIAL ASSESSMENT (HPI)
Patient c/o bilateral ear pain, facial pain/pressure, post nasal drip and cough for 5 days. Had Covid at the end of July 2024.

## 2024-11-13 NOTE — ED PROVIDER NOTES
Patient Seen in: [facility]      History     Chief Complaint   Patient presents with    Ear Pain    Post Nasal Drip    Cough    Sinus Problem     Stated Complaint: ear ache, face hurts,    Subjective:   24-year-old female presents today with complaints of sinus pressure congestion and ear pain for 5 days.  Has had a slight cough.  No vomiting diarrhea no fever chills.  Has been taken over-the-counter Coricidin without much relief.  Patient does have a history of borderline hypertension has seen her primary care physician about this and was told to monitor at this time.  Is hypertensive here denies any headaches dizziness or any neurodeficits.  Alert orientated x 3.  No other symptoms or concerns.  The patient's medication list, past medical history and social history elements as listed in today's nurse's notes were reviewed and agreed (except as otherwise stated in the HPI).  The patient's family history reviewed and determined to be noncontributory to the presenting problem              Objective:     Past Medical History:    Anxiety    Arrest of dilation, delivered, current hospitalization (HCC)    Last Assessment & Plan:  Formatting of this note might be different from the original. - Patient remained 6 cm after 6 hours of inadequate contractions    Benign neoplasm of skin of trunk, except scrotum    HERB (generalized anxiety disorder)    Mononucleosis syndrome    Nexplanon in place    Nexplanon placed 17    Obsessive thinking    Panic attacks    Postpartum anxiety (HCC)    Preeclampsia, severe (HCC)    Last Assessment & Plan:  Formatting of this note might be different from the original. Started on nifedipine 30 mg on POD#1    Traumatic birth    Visual impairment    glasses    Vitamin D deficiency              Past Surgical History:   Procedure Laterality Date          Other surgical history      wisdom teeth    Other surgical history  2023    Excision of pilar cyst                 Social History     Socioeconomic History    Marital status: Single   Tobacco Use    Smoking status: Never     Passive exposure: Never    Smokeless tobacco: Never    Tobacco comments:     non-smoker   Vaping Use    Vaping status: Some Days    Substances: Nicotine, Flavoring    Devices: Disposable, Pre-filled or refillable cartridge   Substance and Sexual Activity    Alcohol use: Yes     Comment: soc, 2x month    Drug use: Not Currently     Types: Cannabis     Social Drivers of Health     Financial Resource Strain: Not At Risk (11/18/2022)    [facility], [facility]    Financial Resource Strain     How hard is it for you to pay for the very basics like food, housing, medical care, and heating?: Not hard at all   Food Insecurity: No Food Insecurity (4/26/2024)    Received from [facility]    Food Insecurity     Currently or in the past 3 months, have you worried your food would run out before you had money to buy more?: No     In the past 12 months, have you run out of food or been unable to get more?: No   Transportation Needs: No Transportation Needs (11/18/2022)    [facility] from [facility], [facility]    Transportation Needs     Medical Transportation Needs?: Patient refused     Daily Living Transportation Needs? [Peds Only] : Patient refused    Received from [facility], [facility]    Social Connections    Received from [facility], [facility]    Housing Stability              Review of Systems    Positive for stated complaint: ear ache, face hurts,  Other systems are as noted in HPI.  Constitutional and vital signs reviewed.      All other systems reviewed and negative except as noted above.    Physical Exam     ED Triage Vitals [11/13/24 0807]   BP (!) 128/110   Pulse 85   Resp 18   Temp 96.7 °F (35.9 °C)   Temp src  Temporal   SpO2 98 %   O2 Device None (Room air)       Current Vitals:   Vital Signs  BP: (!) 128/110  Pulse: 85  Resp: 18  Temp: 96.7 °F (35.9 °C)  Temp src: Temporal    Oxygen Therapy  SpO2: 98 %  O2 Device: None (Room air)        Physical Exam  Vitals and nursing note reviewed.   Constitutional:       Appearance: She is well-developed.   HENT:      Head: Normocephalic.      Right Ear: Tympanic membrane and ear canal normal.      Left Ear: Tympanic membrane and ear canal normal.      Nose: Mucosal edema and congestion present.      Mouth/Throat:      Pharynx: Uvula midline. Posterior oropharyngeal erythema present.   Eyes:      Conjunctiva/sclera: Conjunctivae normal.      Pupils: Pupils are equal, round, and reactive to light.   Cardiovascular:      Rate and Rhythm: Normal rate and regular rhythm.   Pulmonary:      Effort: Pulmonary effort is normal.      Breath sounds: Normal breath sounds.   Musculoskeletal:      Cervical back: Normal range of motion and neck supple.   Lymphadenopathy:      Cervical: No cervical adenopathy.   Skin:     General: Skin is warm and dry.   Neurological:      Mental Status: She is alert and oriented to person, place, and time.             ED Course   Labs Reviewed - No data to display                MDM     Please note that this report has been produced using speech recognition software and may contain errors related to that system including, but not limited to, errors in grammar, punctuation, and spelling, as well as words and phrases that possibly may have been recognized inappropriately.  If there are any questions or concerns, contact the dictating provider for clarification.              Medical Decision Making  Differential diagnosis includes but is not limited to: COVID-19, viral URI, strep throat, influenza, pneumonia, sinusitis, bronchitis      Presented today with complaints of sinus pressure congestion and a slight cough.  She did have COVID-19 in July.  Do suspect viral  cause of illness.  Will give prescription for Medrol Dosepak to help with inflammation of the sinuses.  Encouraged to continue to go counter Coricidin.  Patient did order a blood pressure cuff today instructed on how to monitor and keep a diary for her primary care physician.  To follow-up with her primary care physician 1 week for reevaluation.  Patient verbalized understanding and agreed to plan of care.    Risk  OTC drugs.  Prescription drug management.        Disposition and Plan     Clinical Impression:  1. Acute viral sinusitis         Disposition:  Discharge  11/13/2024  8:25 am    Follow-up:  Wilmer Moralez MD  [address] [facility] [address]  [phone]    In 1 week  As needed          Medications Prescribed:  Current Discharge Medication List        START taking these medications    Details   methylPREDNISolone (MEDROL) 4 MG Oral Tablet Therapy Pack Dosepack: take as directed  Qty: 1 each, Refills: 0                 Supplementary Documentation:

## 2024-11-19 ENCOUNTER — OFFICE VISIT (OUTPATIENT)
Dept: FAMILY MEDICINE CLINIC | Facility: CLINIC | Age: 24
End: 2024-11-19
Payer: COMMERCIAL

## 2024-11-19 VITALS
BODY MASS INDEX: 34.37 KG/M2 | RESPIRATION RATE: 16 BRPM | HEART RATE: 84 BPM | TEMPERATURE: 98 F | SYSTOLIC BLOOD PRESSURE: 120 MMHG | HEIGHT: 67 IN | OXYGEN SATURATION: 97 % | WEIGHT: 219 LBS | DIASTOLIC BLOOD PRESSURE: 82 MMHG

## 2024-11-19 DIAGNOSIS — Z01.30 BLOOD PRESSURE CHECK: Primary | ICD-10-CM

## 2024-11-19 DIAGNOSIS — R00.2 PALPITATIONS: ICD-10-CM

## 2024-11-19 LAB
DEPRECATED HBV CORE AB SER IA-ACNC: 22 NG/ML
IRON SATN MFR SERPL: 35 %
IRON SERPL-MCNC: 118 UG/DL
TOTAL IRON BINDING CAPACITY: 342 UG/DL (ref 250–425)
TRANSFERRIN SERPL-MCNC: 264 MG/DL (ref 250–380)

## 2024-11-19 PROCEDURE — 82728 ASSAY OF FERRITIN: CPT | Performed by: NURSE PRACTITIONER

## 2024-11-19 PROCEDURE — 3008F BODY MASS INDEX DOCD: CPT | Performed by: NURSE PRACTITIONER

## 2024-11-19 PROCEDURE — 83550 IRON BINDING TEST: CPT | Performed by: NURSE PRACTITIONER

## 2024-11-19 PROCEDURE — 3074F SYST BP LT 130 MM HG: CPT | Performed by: NURSE PRACTITIONER

## 2024-11-19 PROCEDURE — 3079F DIAST BP 80-89 MM HG: CPT | Performed by: NURSE PRACTITIONER

## 2024-11-19 PROCEDURE — 83540 ASSAY OF IRON: CPT | Performed by: NURSE PRACTITIONER

## 2024-11-19 PROCEDURE — 99213 OFFICE O/P EST LOW 20 MIN: CPT | Performed by: NURSE PRACTITIONER

## 2024-11-19 NOTE — PATIENT INSTRUCTIONS
Metoprolol    Common side effects may include:    dizziness, tired feeling;  depression, confusion, memory problems;  nightmares, trouble sleeping;  diarrhea; or  mild itching or rash.        Lisinopril    Common side effects may include:    headache, dizziness;  low blood pressure, cough

## 2024-11-19 NOTE — PROGRESS NOTES
CHIEF COMPLAINT:    Chief Complaint   Patient presents with    Follow - Up     Blood pressure follow up, strong family history of elevated blood pressure       HISTORY OF PRESENT ILLNESS:    Noemi who has a history of preeclampsia approximately 2 years ago and a family history of coronary heart disease in paternal grandfather presents today, November 19, 2024, for blood pressure check.    Reports elevated blood pressure readings with office visits.  Does not feel nervous or anxious while at appointments.  Checking blood pressures at home, which are elevated; however, blood pressure cuff is not accurate when compared to in office readings.    Noemi experiences intermittent headaches, difficulty sleeping, and palpitations.  Taking multivitamin inconsistently, capsules and confirms multivitamin contains iron  Chest heaviness at night, goes away after relaxing for about 15 minutes  Wears smart watch, resting heart rate is typically 88  Denies coughing or wheezing  Denies chest pain or near fainting    ALLERGIES:  Allergies[1]    CURRENT MEDICATIONS:  Current Outpatient Medications   Medication Sig Dispense Refill    Levonorgestrel (MIRENA, 52 MG,) 20 MCG/DAY Intrauterine IUD 1 Intra Uterine Device by Intrauterine route one time.         MEDICAL HISTORY:  Past Medical History:    Anxiety    Arrest of dilation, delivered, current hospitalization (HCC)    Last Assessment & Plan:  Formatting of this note might be different from the original. - Patient remained 6 cm after 6 hours of inadequate contractions    Benign neoplasm of skin of trunk, except scrotum    HERB (generalized anxiety disorder)    Mononucleosis syndrome    Nexplanon in place    Nexplanon placed 5/9/17    Obsessive thinking    Panic attacks    Postpartum anxiety (HCC)    Preeclampsia, severe (HCC)    Last Assessment & Plan:  Formatting of this note might be different from the original. Started on nifedipine 30 mg on POD#1    Traumatic birth    Visual  impairment    glasses    Vitamin D deficiency     Past Surgical History:   Procedure Laterality Date          Other surgical history      wisdom teeth    Other surgical history  2023    Excision of pilar cyst     Family History   Problem Relation Age of Onset    Anxiety Mother     Heart Disease Maternal Grandfather     Stroke Paternal Grandfather      Family Status   Relation Status    Fa Alive    Mo Alive    Son Alive    MGMA Alive    MGFA Alive    PGMA Alive    PGFA Alive    Sis Alive    Bro Alive     Social History     Socioeconomic History    Marital status: Single   Tobacco Use    Smoking status: Never     Passive exposure: Never    Smokeless tobacco: Never    Tobacco comments:     non-smoker   Vaping Use    Vaping status: Some Days    Substances: Nicotine, Flavoring    Devices: Disposable, Pre-filled or refillable cartridge   Substance and Sexual Activity    Alcohol use: Yes     Comment: soc, 2x month    Drug use: Not Currently     Types: Cannabis     Social Drivers of Health     Financial Resource Strain: Not At Risk (2022)    [facility]    Financial Resource Strain     How hard is it for you to pay for the very basics like food, housing, medical care, and heating?: Not hard at all   Food Insecurity: No Food Insecurity (2024)    Received from [facility]    Food Insecurity     Currently or in the past 3 months, have you worried your food would run out before you had money to buy more?: No     In the past 12 months, have you run out of food or been unable to get more?: No   Transportation Needs: No Transportation Needs (2022)    Received from [facility], [facility]    Transportation Needs     Medical Transportation Needs?: Patient refused     Daily Living Transportation Needs? [Peds Only] : Patient refused    Received from [facility],  [facility]    Social Connections    Received from [facility], [facility]    Housing Stability       ROS:  GENERAL:  Denies recorded temperatures greater than 100.5F  RESPIRATORY:  Denies difficulty breathing  CARDIAC:  Denies chest pain with exertion    VITALS:   /82 (BP Location: Left arm, Patient Position: Sitting)   Pulse 84   Temp 97.8 °F (36.6 °C) (Temporal)   Resp 16   Ht 5' 7\" (1.702 m)   Wt 219 lb (99.3 kg)   LMP 11/06/2024 (Exact Date)   SpO2 97%   BMI 34.30 kg/m²     Reviewed by Alicia Capellan MS, APRN, FNP-BC    PHYSICAL EXAM:    Physical Exam  Constitutional:       General: She is not in acute distress.     Appearance: Normal appearance.   HENT:      Head: Normocephalic and atraumatic.   Cardiovascular:      Rate and Rhythm: Normal rate.   Pulmonary:      Effort: Pulmonary effort is normal.   Musculoskeletal:      Cervical back: Neck supple.   Skin:     General: Skin is warm and dry.   Neurological:      General: No focal deficit present.      Mental Status: She is alert and oriented to person, place, and time.   Psychiatric:         Mood and Affect: Mood normal.         Behavior: Behavior normal.         Thought Content: Thought content normal.         Judgment: Judgment normal.         ASSESSMENT & PLAN:    1. Blood pressure check  Stable  Noemi to review possible medications:  metoprolol vs lisinopril  Leaning towards metoprolol given borderline heart rate, palpitations, headaches, and history of anxiety  Awaiting iron levels, restart multivitamin, begin consistent use    2. Palpitations  - VENIPUNCTURE  - Ferritin; Future  - Iron And Tibc; Future  - Ferritin  - Iron And Tibc    Follow-up 1 month         [1] No Known Allergies

## 2024-12-08 ENCOUNTER — OFFICE VISIT (OUTPATIENT)
Dept: FAMILY MEDICINE CLINIC | Facility: CLINIC | Age: 24
End: 2024-12-08
Payer: COMMERCIAL

## 2024-12-08 VITALS
HEART RATE: 87 BPM | OXYGEN SATURATION: 97 % | HEIGHT: 68 IN | SYSTOLIC BLOOD PRESSURE: 126 MMHG | WEIGHT: 209 LBS | TEMPERATURE: 98 F | DIASTOLIC BLOOD PRESSURE: 84 MMHG | RESPIRATION RATE: 16 BRPM | BODY MASS INDEX: 31.67 KG/M2

## 2024-12-08 DIAGNOSIS — J02.0 STREP PHARYNGITIS: ICD-10-CM

## 2024-12-08 DIAGNOSIS — J02.9 SORE THROAT: Primary | ICD-10-CM

## 2024-12-08 LAB
CONTROL LINE PRESENT WITH A CLEAR BACKGROUND (YES/NO): YES YES/NO
KIT LOT #: ABNORMAL NUMERIC
STREP GRP A CUL-SCR: POSITIVE

## 2024-12-08 RX ORDER — CEFDINIR 300 MG/1
600 CAPSULE ORAL DAILY
Qty: 20 CAPSULE | Refills: 0 | Status: SHIPPED | OUTPATIENT
Start: 2024-12-08 | End: 2024-12-18

## 2024-12-08 NOTE — PROGRESS NOTES
CHIEF COMPLAINT:     Chief Complaint   Patient presents with    Sore Throat     4 days, sore throat, felt feverish, nausea, some vomiting  OTC advil       HPI:   Noemi Amezquita is a 24 year old female presents to clinic with symptoms of sore throat. Patient has had for 4 days. Symptoms have been worsening since onset.  Patient denies following associated symptoms: congestion, cough, fever, headache, stomach upset, rash. Has + history of strep. + strep pharyngitis exposure, son.  Treating symptoms with: tylenol and one dose of IBU yesterday.  Remote history of significant strep as a child was on 30 day course of abx and pt requesting not to be put on amoxicillin, stating it doesn't work well for her.    Current Outpatient Medications   Medication Sig Dispense Refill    cefdinir 300 MG Oral Cap Take 2 capsules (600 mg total) by mouth daily for 10 days. 20 capsule 0    metoprolol tartrate 25 MG Oral Tab Take 1 tablet (25 mg total) by mouth 2 (two) times daily. 60 tablet 0    Levonorgestrel (MIRENA, 52 MG,) 20 MCG/DAY Intrauterine IUD 1 Intra Uterine Device by Intrauterine route one time.        Past Medical History:    Anxiety    Arrest of dilation, delivered, current hospitalization (HCC)    Last Assessment & Plan:  Formatting of this note might be different from the original. - Patient remained 6 cm after 6 hours of inadequate contractions    Benign neoplasm of skin of trunk, except scrotum    HERB (generalized anxiety disorder)    Mononucleosis syndrome    Nexplanon in place    Nexplanon placed 5/9/17    Obsessive thinking    Panic attacks    Postpartum anxiety (HCC)    Preeclampsia, severe (HCC)    Last Assessment & Plan:  Formatting of this note might be different from the original. Started on nifedipine 30 mg on POD#1    Traumatic birth    Visual impairment    glasses    Vitamin D deficiency      Social History:  Social History     Socioeconomic History    Marital status: Single   Tobacco Use    Smoking  status: Never     Passive exposure: Never    Smokeless tobacco: Never    Tobacco comments:     non-smoker   Vaping Use    Vaping status: Some Days    Substances: Nicotine, Flavoring    Devices: Disposable, Pre-filled or refillable cartridge   Substance and Sexual Activity    Alcohol use: Yes     Comment: soc, 2x month    Drug use: Not Currently     Types: Cannabis     Social Drivers of Health     Financial Resource Strain: Not At Risk (11/18/2022)    [facility], [facility]    Financial Resource Strain     How hard is it for you to pay for the very basics like food, housing, medical care, and heating?: Not hard at all   Food Insecurity: No Food Insecurity (4/26/2024)    Received from [facility]    Food Insecurity     Currently or in the past 3 months, have you worried your food would run out before you had money to buy more?: No     In the past 12 months, have you run out of food or been unable to get more?: No   Transportation Needs: No Transportation Needs (11/18/2022)    Received from [facility], [facility]    Transportation Needs     Currently or in the past 3 months, has lack of transportation kept you from medical appointments, getting food or medicine, or providing care to a family member?: Unrecognized value     Has the lack of transportation kept you from meetings, work, or from getting things needed for daily living?: Unrecognized value     Medical Transportation Needs?: Patient refused     Daily Living Transportation Needs? [Peds Only] : Patient refused    Received from [facility], [facility]    Social Connections    Received from [facility], [facility]    Housing Stability        REVIEW OF SYSTEMS:   GENERAL HEALTH:  See HPI  SKIN: denies any unusual skin lesions or rashes  HEENT: denies ear pain, See  HPI  RESPIRATORY: denies shortness of breath, or wheezing  CARDIOVASCULAR: denies chest pain, palpitations   GI: denies abdominal pain, constipation and diarrhea  NEURO: denies dizziness or lightheadedness    EXAM:   /84   Pulse 87   Temp 98.4 °F (36.9 °C)   Resp 16   Ht 5' 8\" (1.727 m)   Wt 209 lb (94.8 kg)   LMP 12/06/2024 (Exact Date)   SpO2 97%   Breastfeeding No   BMI 31.78 kg/m²   GENERAL: well developed, well nourished,in no apparent distress  SKIN: no rashes,no suspicious lesions  HEAD: atraumatic, normocephalic  EYES: conjunctiva clear, EOM intact  EARS: TM's clear, non-injected, no bulging, retraction, or fluid  NOSE: nostrils patent, no exudates, nasal mucosa pink and noninflamed  THROAT: oral mucosa pink, moist. Posterior pharynx erythematous and injected. + exudate to right tonsil.. Tonsils 3/4.  Breath non malodorous. No uvular deviation. No drooling.  NECK: supple  LUNGS: clear to auscultation bilaterally, no wheezes or rhonchi. Breathing is non labored.  CARDIO: RRR without murmur  GI: good BS's,no masses, hepatosplenomegaly, or tenderness on direct palpation  EXTREMITIES: no cyanosis, clubbing or edema  LYMPH: + anterior cervical. + submandibular lymphadenopathy.  No posterior cervical or occipital lymphadenopathy.    Recent Results (from the past 24 hours)   Strep A Assay W/Optic    Collection Time: 12/08/24 12:23 PM   Result Value Ref Range    Strep Grp A Screen positive Negative    Control Line Present with a clear background (yes/no) yes Yes/No    Kit Lot # [id number] Numeric    Kit Expiration Date 7-1-25 Date       ASSESSMENT AND PLAN:   Assessment: Strep Pharyngitis: Rapid strep screen is positive.    Plan:  Prescription: as below.     Requested Prescriptions     Signed Prescriptions Disp Refills    cefdinir 300 MG Oral Cap 20 capsule 0     Sig: Take 2 capsules (600 mg total) by mouth daily for 10 days.       Risks, benefits, complications and side effects of meds discussed with  patient.     OTC Tylenol/Motrin prn. Push fluids- warm or cool liquids, whichever is soothing for patient  Change tooth brush two days into therapy. Warm salt water gargles 2 times per day for at least 3 days.  Do not share utensils or drinks with anyone.  Education attached.      Follow up in 3-5 days if not improving, condition worsens, or fever greater than or equal to 100.4 persists for 72 hours.      The patient/parent indicates understanding of these issues and agrees to the plan.  The patient is asked to follow up with their PCP prn.

## 2024-12-23 ENCOUNTER — HOSPITAL ENCOUNTER (OUTPATIENT)
Age: 24
Discharge: HOME | End: 2024-12-23
Payer: COMMERCIAL

## 2024-12-23 VITALS
DIASTOLIC BLOOD PRESSURE: 89 MMHG | TEMPERATURE: 99 F | SYSTOLIC BLOOD PRESSURE: 128 MMHG | WEIGHT: 220 LBS | RESPIRATION RATE: 20 BRPM | BODY MASS INDEX: 33 KG/M2 | HEART RATE: 85 BPM | OXYGEN SATURATION: 99 %

## 2024-12-23 DIAGNOSIS — J01.10 ACUTE NON-RECURRENT FRONTAL SINUSITIS: Primary | ICD-10-CM

## 2024-12-23 PROCEDURE — 99213 OFFICE O/P EST LOW 20 MIN: CPT | Performed by: PHYSICIAN ASSISTANT

## 2024-12-23 RX ORDER — FLUTICASONE PROPIONATE 50 MCG
2 SPRAY, SUSPENSION (ML) NASAL DAILY
Qty: 16 G | Refills: 0 | Status: SHIPPED | OUTPATIENT
Start: 2024-12-23 | End: 2025-01-22

## 2024-12-23 RX ORDER — PREDNISONE 20 MG/1
40 TABLET ORAL DAILY
Qty: 10 TABLET | Refills: 0 | Status: SHIPPED | OUTPATIENT
Start: 2024-12-23 | End: 2024-12-28

## 2024-12-23 NOTE — ED PROVIDER NOTES
Patient Seen in: Immediate Care [facility]      History     Chief Complaint   Patient presents with    Sinus Problem     Tested positive for strep throat - have had extreme sinus/head/face pressure since with constant drainage that is bloody and green. - Entered by patient     Stated Complaint: Sinus Problem - Tested positive for strep throat - have had extreme sinus/h*    Subjective:   The history is provided by the patient.         24-year-old female with past medical history of HERB, HTN presents to the immediate care due to congestion for the past 5 days.  Associated bilateral pressure in his ears.  Minimal sore throat mainly from the drainage.  Mild dry cough without chest pain or shortness of breath.  No fevers.  Was recently diagnosed with strep 10 days ago and placed on cefdinir which she completed however nasal congestion has continued.  Intermittently taking decongestants over-the-counter without relief.  No known sick contacts.    Objective:     Past Medical History:    Anxiety    Arrest of dilation, delivered, current hospitalization (HCC)    Last Assessment & Plan:  Formatting of this note might be different from the original. - Patient remained 6 cm after 6 hours of inadequate contractions    Benign neoplasm of skin of trunk, except scrotum    HERB (generalized anxiety disorder)    Mononucleosis syndrome    Nexplanon in place    Nexplanon placed 17    Obsessive thinking    Panic attacks    Postpartum anxiety (HCC)    Preeclampsia, severe ([facility])    Last Assessment & Plan:  Formatting of this note might be different from the original. Started on nifedipine 30 mg on POD#1    Traumatic birth    Visual impairment    glasses    Vitamin D deficiency              Past Surgical History:   Procedure Laterality Date          Other surgical history      wisdom teeth    Other surgical history  2023    Excision of pilar cyst                Social History     Socioeconomic History     Marital status: Single   Tobacco Use    Smoking status: Never     Passive exposure: Never    Smokeless tobacco: Never    Tobacco comments:     non-smoker   Vaping Use    Vaping status: Some Days    Substances: Nicotine, Flavoring    Devices: Disposable, Pre-filled or refillable cartridge   Substance and Sexual Activity    Alcohol use: Yes     Comment: soc, 2x month    Drug use: Not Currently     Types: Cannabis     Social Drivers of Health     Financial Resource Strain: Not At Risk (11/18/2022)    [facility]    Financial Resource Strain     How hard is it for you to pay for the very basics like food, housing, medical care, and heating?: Not hard at all   Food Insecurity: No Food Insecurity (4/26/2024)    Received from [facility]    Food Insecurity     Currently or in the past 3 months, have you worried your food would run out before you had money to buy more?: No     In the past 12 months, have you run out of food or been unable to get more?: No   Transportation Needs: No Transportation Needs (11/18/2022)    [facility]    Transportation Needs     Currently or in the past 3 months, has lack of transportation kept you from medical appointments, getting food or medicine, or providing care to a family member?: Unrecognized value     Has the lack of transportation kept you from meetings, work, or from getting things needed for daily living?: Unrecognized value     Medical Transportation Needs?: Patient refused     Daily Living Transportation Needs? [Peds Only] : Patient refused    [facility]    Social Connections    [facility]    Housing Stability              Review of Systems   Constitutional:  Negative for chills, fatigue and fever.    HENT:  Positive for congestion, sinus pressure and sinus pain. Negative for sore throat, trouble swallowing and voice change.    Respiratory:  Positive for cough. Negative for shortness of breath, wheezing and stridor.    Cardiovascular: Negative.    Gastrointestinal: Negative.        Positive for stated complaint: Sinus Problem - Tested positive for strep throat 12/9- have had extreme sinus/h*  Other systems are as noted in HPI.  Constitutional and vital signs reviewed.      All other systems reviewed and negative except as noted above.    Physical Exam     ED Triage Vitals   BP 12/23/24 1050 128/89   Pulse 12/23/24 1027 85   Resp 12/23/24 1027 20   Temp 12/23/24 1027 98.7 °F (37.1 °C)   Temp src 12/23/24 1027 Oral   SpO2 12/23/24 1027 99 %   O2 Device 12/23/24 1027 None (Room air)       Current Vitals:   Vital Signs  BP: 128/89  Pulse: 85  Resp: 20  Temp: 98.7 °F (37.1 °C)  Temp src: Oral    Oxygen Therapy  SpO2: 99 %  O2 Device: None (Room air)        Physical Exam  Vitals and nursing note reviewed.   Constitutional:       General: She is not in acute distress.     Appearance: Normal appearance.   HENT:      Head: Normocephalic.      Right Ear: Tympanic membrane, ear canal and external ear normal.      Left Ear: Tympanic membrane, ear canal and external ear normal.      Nose: Congestion present.      Mouth/Throat:      Mouth: Mucous membranes are moist.      Pharynx: No oropharyngeal exudate or posterior oropharyngeal erythema.      Comments: PND  Eyes:      Extraocular Movements: Extraocular movements intact.      Conjunctiva/sclera: Conjunctivae normal.      Pupils: Pupils are equal, round, and reactive to light.   Cardiovascular:      Rate and Rhythm: Normal rate and regular rhythm.   Pulmonary:      Effort: Pulmonary effort is normal. No respiratory distress.      Breath sounds: Normal breath sounds.   Skin:     General: Skin is warm.   Neurological:      General: No focal deficit present.      Mental  Status: She is alert and oriented to person, place, and time.   Psychiatric:         Mood and Affect: Mood normal.         Behavior: Behavior normal.             ED Course   Labs Reviewed - No data to display                MDM   Ddx -viral URI with cough, COVID, influenza, sinusitis    On exam the patient is in no acute distress.  Vital signs are stable.  Copious nasal congestion present but nasal turbinates are pale mildly edematous.  Posterior pharynx shows postnasal drip.  Rest exam is unremarkable.  Offered viral testing but patient politely declines.  Clinical exam is consistent with a viral URI most likely causing inflammatory reaction in the sinuses.  Clinically no concern for bacterial sinusitis at this time.  Will start on prednisone and Flonase.  Advised use antihistamines.  Discussed at length with the patient at home care and strict return precautions.  All questions were answered and the patient will treatment plan discharge home        Medical Decision Making  Problems Addressed:  Acute non-recurrent frontal sinusitis: acute illness or injury    Risk  OTC drugs.  Prescription drug management.        Disposition and Plan     Clinical Impression:  1. Acute non-recurrent frontal sinusitis         Disposition:  Discharge  12/23/2024 10:51 am    Follow-up:  Wilmer Moralez MD  [address] [facility] [address]  [phone]                Medications Prescribed:  Discharge Medication List as of 12/23/2024 11:01 AM        START taking these medications    Details   predniSONE 20 MG Oral Tab Take 2 tablets (40 mg total) by mouth daily for 5 days., Normal, Disp-10 tablet, R-0      fluticasone propionate 50 MCG/ACT Nasal Suspension 2 sprays by Nasal route daily., Normal, Disp-16 g, R-0                 Supplementary Documentation:

## 2024-12-23 NOTE — DISCHARGE INSTRUCTIONS
Increase fluids and rest  Start antihistamine claritin/zyrtec  Prednisone daily until gone  Bridge with flonase - as we discussed   Follow up with primary care doctor  Return to the ER if symptoms worsen

## 2024-12-23 NOTE — ED INITIAL ASSESSMENT (HPI)
Pt had strep on 12/9  stopped antibiotic 12/19, and now has had sinus drainage that is green and has had this for the past 5 days no fever

## 2025-01-30 PROBLEM — R06.83 SNORING: Status: ACTIVE | Noted: 2025-01-30

## 2025-01-30 NOTE — PROGRESS NOTES
[facility]  SLEEP PROGRESS NOTE        HPI:   This is a 24 year old female coming in for   Chief Complaint   Patient presents with    New Patient     Pt here for sleep consult. Sleep questionnaire: .  Pt states snoring and tired all the time.        HPI:   Since having her baby in   Having hypertension since  Snoring her whole life  Working full time, [employer]  Snoring wakes her, wakes gasping  Mouth is open and dry   Sleeps on left side mainly  She is tired all the time  Can fall asleep anytime no matter how many hours    No issues falling asleep  Hard to go back to sleep for hours nightly around 1-3am  Gets in bed 930 to 10,   No naps   Child age 2 , goes to       Patient: Sleep review of systems today: see form.      Pt  PCP:  Wilmer Moralez MD  No referring provider defined for this encounter.           No data to display                    Past Medical History:    Anxiety    Arrest of dilation, delivered, current hospitalization (HCC)    Last Assessment & Plan:  Formatting of this note might be different from the original. - Patient remained 6 cm after 6 hours of inadequate contractions    Benign neoplasm of skin of trunk, except scrotum    HERB (generalized anxiety disorder)    Mononucleosis syndrome    Nexplanon in place    Nexplanon placed 17    Obsessive thinking    Panic attacks    Postpartum anxiety (HCC)    Preeclampsia, severe (HCC)    Last Assessment & Plan:  Formatting of this note might be different from the original. Started on nifedipine 30 mg on POD#1    Traumatic birth    Visual impairment    glasses    Vitamin D deficiency     Past Surgical History:   Procedure Laterality Date          Other surgical history      wisdom teeth    Other surgical history  2023    Excision of pilar cyst     Social History:  Social History     Social History Narrative    Not on file     Social History     Socioeconomic History    Marital status:  Single   Tobacco Use    Smoking status: Never     Passive exposure: Never    Smokeless tobacco: Never    Tobacco comments:     non-smoker   Vaping Use    Vaping status: Some Days    Substances: Nicotine, Flavoring    Devices: Disposable, Pre-filled or refillable cartridge   Substance and Sexual Activity    Alcohol use: Yes     Comment: soc, 2x month    Drug use: Not Currently     Types: Cannabis     Social Drivers of Health     Financial Resource Strain: Not At Risk (11/18/2022)    [facility]    Financial Resource Strain     How hard is it for you to pay for the very basics like food, housing, medical care, and heating?: Not hard at all   Food Insecurity: No Food Insecurity (4/26/2024)    Received from [facility]    Food Insecurity     Currently or in the past 3 months, have you worried your food would run out before you had money to buy more?: No     In the past 12 months, have you run out of food or been unable to get more?: No   Transportation Needs: No Transportation Needs (11/18/2022)    [facility]    Transportation Needs     Currently or in the past 3 months, has lack of transportation kept you from medical appointments, getting food or medicine, or providing care to a family member?: Unrecognized value     Has the lack of transportation kept you from meetings, work, or from getting things needed for daily living?: Unrecognized value     Medical Transportation Needs?: Patient refused     Daily Living Transportation Needs? [Peds Only] : Patient refused    [facility]    Social Connections    [facility]    Housing Stability     Family History:  Family History   Problem Relation Age of Onset    Anxiety Mother     Heart Disease Maternal  Grandfather     Stroke Paternal Grandfather      Allergies:  Allergies[1]  Current Meds:  Current Outpatient Medications   Medication Sig Dispense Refill    metoprolol tartrate 25 MG Oral Tab Take 1 tablet (25 mg total) by mouth 2 (two) times daily. 60 tablet 0    Levonorgestrel (MIRENA, 52 MG,) 20 MCG/DAY Intrauterine IUD 1 Intra Uterine Device by Intrauterine route one time.        Counseling given: Not Answered  Tobacco comments: non-smoker         Problem List:  Patient Active Problem List   Diagnosis    Vitamin D deficiency    HERB (generalized anxiety disorder)    Obesity (BMI 30-39.9)    Postpartum anxiety (HCC)    Traumatic birth    Obsessive thinking    Panic attacks    Adjustment insomnia    Panic    Diarrhea    Dyspepsia    Snoring       REVIEW OF SYSTEMS:   Review of Systems    EXAM:   /84   Pulse 103   Resp 16   Ht 5' 8\" (1.727 m)   Wt 209 lb (94.8 kg)   LMP 12/06/2024 (Exact Date)   SpO2 99%   BMI 31.78 kg/m²  Estimated body mass index is 31.78 kg/m² as calculated from the following:    Height as of this encounter: 5' 8\" (1.727 m).    Weight as of this encounter: 209 lb (94.8 kg).   Neck in inches:      Wt Readings from Last 6 Encounters:   01/30/25 209 lb (94.8 kg)   12/23/24 220 lb (99.8 kg)   12/08/24 209 lb (94.8 kg)   11/19/24 219 lb (99.3 kg)   09/23/24 221 lb 6.4 oz (100.4 kg)   08/23/24 220 lb (99.8 kg)     BP Readings from Last 3 Encounters:   01/30/25 116/84   12/23/24 128/89   12/08/24 126/84     Pulse Readings from Last 3 Encounters:   01/30/25 103   12/23/24 85   12/08/24 87     SpO2 Readings from Last 3 Encounters:   01/30/25 99%   12/23/24 99%   12/08/24 97%      Ideal body weight: 63.9 kg (140 lb 14 oz)  Adjusted ideal body weight: 76.3 kg (168 lb 2 oz)    Vital signs reviewed.  Physical Exam    ASSESSMENT AND PLAN:   1. Adjustment insomnia    2. HERB (generalized anxiety disorder)    3. Obesity (BMI 30-39.9)    4. Snoring  Evaluate for SDB   Snoring,  headaches, mouth  lana, EDS, hypertension    Consider ENT    There are no Patient Instructions on file for this visit.        Meds & Refills for this Visit:  Requested Prescriptions      No prescriptions requested or ordered in this encounter       Outcome: Parent verbalizes understanding. Parent is notified to call with any questions, complications, allergies, or worsening or changing symptoms.  Parent is to call with any side effects or complications from the treatments as a result of today.     \" This note was created utilizing Dragon speech recognition software.  Please excuse any grammatical errors. Call my office if you have any questions regarding this note. \"     Moses Griggs, DO  1/30/2025  1:14 PM         [1] No Known Allergies

## 2025-02-10 ENCOUNTER — OFFICE VISIT (OUTPATIENT)
Dept: SLEEP CENTER | Age: 25
End: 2025-02-10
Attending: Other
Payer: COMMERCIAL

## 2025-02-10 DIAGNOSIS — R06.83 SNORING: ICD-10-CM

## 2025-02-10 DIAGNOSIS — E66.9 OBESITY (BMI 30-39.9): ICD-10-CM

## 2025-02-10 DIAGNOSIS — F51.02 ADJUSTMENT INSOMNIA: ICD-10-CM

## 2025-02-10 DIAGNOSIS — F41.1 GAD (GENERALIZED ANXIETY DISORDER): ICD-10-CM

## 2025-02-10 PROCEDURE — 95810 POLYSOM 6/> YRS 4/> PARAM: CPT

## 2025-02-21 ENCOUNTER — SLEEP STUDY (OUTPATIENT)
Facility: CLINIC | Age: 25
End: 2025-02-21
Payer: COMMERCIAL

## 2025-02-21 DIAGNOSIS — G47.33 OBSTRUCTIVE SLEEP APNEA SYNDROME: Primary | ICD-10-CM

## 2025-02-21 PROCEDURE — 95810 POLYSOM 6/> YRS 4/> PARAM: CPT | Performed by: OTHER

## 2025-02-24 ENCOUNTER — OFFICE VISIT (OUTPATIENT)
Facility: CLINIC | Age: 25
End: 2025-02-24
Payer: COMMERCIAL

## 2025-02-24 VITALS
WEIGHT: 202 LBS | RESPIRATION RATE: 16 BRPM | DIASTOLIC BLOOD PRESSURE: 80 MMHG | HEART RATE: 84 BPM | OXYGEN SATURATION: 100 % | SYSTOLIC BLOOD PRESSURE: 122 MMHG | BODY MASS INDEX: 30.62 KG/M2 | HEIGHT: 68 IN

## 2025-02-24 DIAGNOSIS — E66.811 CLASS 1 OBESITY DUE TO EXCESS CALORIES WITHOUT SERIOUS COMORBIDITY WITH BODY MASS INDEX (BMI) OF 30.0 TO 30.9 IN ADULT: ICD-10-CM

## 2025-02-24 DIAGNOSIS — E66.09 CLASS 1 OBESITY DUE TO EXCESS CALORIES WITHOUT SERIOUS COMORBIDITY WITH BODY MASS INDEX (BMI) OF 30.0 TO 30.9 IN ADULT: ICD-10-CM

## 2025-02-24 DIAGNOSIS — G47.33 OSA (OBSTRUCTIVE SLEEP APNEA): Primary | ICD-10-CM

## 2025-02-24 DIAGNOSIS — G47.10 HYPERSOMNIA: ICD-10-CM

## 2025-02-24 PROCEDURE — 3074F SYST BP LT 130 MM HG: CPT | Performed by: OTHER

## 2025-02-24 PROCEDURE — 99214 OFFICE O/P EST MOD 30 MIN: CPT | Performed by: OTHER

## 2025-02-24 PROCEDURE — 3008F BODY MASS INDEX DOCD: CPT | Performed by: OTHER

## 2025-02-24 PROCEDURE — 3079F DIAST BP 80-89 MM HG: CPT | Performed by: OTHER

## 2025-02-24 NOTE — PROGRESS NOTES
[facility] PULMONARY  SLEEP PROGRESS NOTE        HPI:   This is a 25 year old female coming in for   Chief Complaint   Patient presents with    Obstructive Sleep Apnea (ANGELA)     Pt here for annual check up.  S/S performed 2/17/2025. Pt states no issues at this time.       HPI: hypersomnia  ESS 17/24  If naps more than 1 hour, she feels better, lunchtime is worst time    In bed 9-930, wakes around 630  She wakes tired but not as bad as lunch time   Respiratory Analysis: The Apnea-Hypopnea Index (AHI) was 5.6 events per hour. REM related AHI was 6.4 events per hour. Supine related AHI was 4.8. Lateral related AHI was 5.6. The Central Apnea Index was 0. The oxygen saturation dk was 94.0% and patient spent 0% with saturations less than 88%.   Snoring Profile: Snoring was mild.     Has been losing weight since having her son  Trying to get to 180  Snoring and hypersomnia are both since high school  Always tired, required naps  She has ENT evaluation    No vivid dreams  Occ sleep paralysis  Dreams during naps  Pupils are dialated  Headaches--migraines around lunchtime  Lightheaded at times  Sleeps on left side      Patient: Sleep review of systems today: see form.      Pt  PCP:  Wilmer Moralez MD  No referring provider defined for this encounter.           No data to display                    Past Medical History:    Anxiety    Arrest of dilation, delivered, current hospitalization (HCC)    Last Assessment & Plan:  Formatting of this note might be different from the original. - Patient remained 6 cm after 6 hours of inadequate contractions    Benign neoplasm of skin of trunk, except scrotum    HERB (generalized anxiety disorder)    Mononucleosis syndrome    Nexplanon in place    Nexplanon placed 5/9/17    Obsessive thinking    Panic attacks    Postpartum anxiety (HCC)    Preeclampsia, severe (HCC)    Last Assessment & Plan:  Formatting of this note might be different from the original. Started on nifedipine 30 mg on  POD#1    Traumatic birth    Visual impairment    glasses    Vitamin D deficiency     Past Surgical History:   Procedure Laterality Date          Other surgical history      wisdom teeth    Other surgical history  2023    Excision of pilar cyst     Social History:  Social History     Social History Narrative    Not on file     Social History     Socioeconomic History    Marital status: Single   Tobacco Use    Smoking status: Never     Passive exposure: Never    Smokeless tobacco: Never    Tobacco comments:     non-smoker   Vaping Use    Vaping status: Some Days    Substances: Nicotine, Flavoring    Devices: Disposable, Pre-filled or refillable cartridge   Substance and Sexual Activity    Alcohol use: Yes     Comment: soc, 2x month    Drug use: Not Currently     Types: Cannabis     Social Drivers of Health     Food Insecurity: No Food Insecurity (2024)    Received from [facility]    Food Insecurity     Currently or in the past 3 months, have you worried your food would run out before you had money to buy more?: No     In the past 12 months, have you run out of food or been unable to get more?: No   Transportation Needs: No Transportation Needs (2022)    [facility]    Transportation Needs     Currently or in the past 3 months, has lack of transportation kept you from medical appointments, getting food or medicine, or providing care to a family member?: Unrecognized value     Has the lack of transportation kept you from meetings, work, or from getting things needed for daily living?: Unrecognized value     Medical Transportation Needs?: Patient refused     Daily Living Transportation Needs? [Peds Only] : Patient refused    [facility]    Housing Stability     Family History:  Family History   Problem Relation Age of Onset    Anxiety Mother      Heart Disease Maternal Grandfather     Stroke Paternal Grandfather      Allergies:  Allergies[1]  Current Meds:  Current Outpatient Medications   Medication Sig Dispense Refill    metoprolol tartrate 25 MG Oral Tab Take 1 tablet (25 mg total) by mouth 2 (two) times daily. 60 tablet 0    Levonorgestrel (MIRENA, 52 MG,) 20 MCG/DAY Intrauterine IUD 1 Intra Uterine Device by Intrauterine route one time.        Counseling given: Not Answered  Tobacco comments: non-smoker         Problem List:  Patient Active Problem List   Diagnosis    Vitamin D deficiency    HERB (generalized anxiety disorder)    Obesity (BMI 30-39.9)    Postpartum anxiety (HCC)    Traumatic birth    Obsessive thinking    Panic attacks    Adjustment insomnia    Panic    Diarrhea    Dyspepsia    Snoring    ANGELA (obstructive sleep apnea)    Hypersomnia    Class 1 obesity due to excess calories without serious comorbidity with body mass index (BMI) of 30.0 to 30.9 in adult       REVIEW OF SYSTEMS:   Review of Systems    EXAM:   /80   Pulse 84   Resp 16   Ht 5' 8\" (1.727 m)   Wt 202 lb (91.6 kg)   LMP 12/06/2024 (Exact Date)   SpO2 100%   BMI 30.71 kg/m²  Estimated body mass index is 30.71 kg/m² as calculated from the following:    Height as of this encounter: 5' 8\" (1.727 m).    Weight as of this encounter: 202 lb (91.6 kg).   Neck in inches:      Wt Readings from Last 6 Encounters:   02/24/25 202 lb (91.6 kg)   01/30/25 209 lb (94.8 kg)   12/23/24 220 lb (99.8 kg)   12/08/24 209 lb (94.8 kg)   11/19/24 219 lb (99.3 kg)   09/23/24 221 lb 6.4 oz (100.4 kg)     BP Readings from Last 3 Encounters:   02/24/25 122/80   01/30/25 116/84   12/23/24 128/89     Pulse Readings from Last 3 Encounters:   02/24/25 84   01/30/25 103   12/23/24 85     SpO2 Readings from Last 3 Encounters:   02/24/25 100%   01/30/25 99%   12/23/24 99%      Ideal body weight: 63.9 kg (140 lb 14 oz)  Adjusted ideal body weight: 75 kg (165 lb 5.2 oz)    Vital signs  reviewed.  Physical Exam    ASSESSMENT AND PLAN:   1. ANGEAL (obstructive sleep apnea) mild angela  The pathophysiology and mechanisms of obstructive sleep apnea were explained.  Adverse health consequences seen in association with ANGELA include increased risk of cardiovascular events , cerebrovascular events, hypertension,  diabetes. Treatment options were discussed.  Positive airway pressure therapy is the gold standard.  Other options include mandibular advancement devices, evaluation of the upper airway by ear nose throat specialist, inspire therapy, and weight loss to be used in conjunction.   2. Hypersomnia  May need mslt if ANGELA treatment does not change her hypersomnia    3. Class 1 obesity due to excess calories without serious comorbidity with body mass index (BMI) of 30.0 to 30.9 in adult    There are no Patient Instructions on file for this visit.    Independent interpretation of Sleep Download as defined above.  Continue with Rx management of Sleep apnea with PAP therapy.    COMPLIANCE is required by insurance for 4 hours a night most nights of the week.    Advised if still with sleep apnea and not using CPAP has a 7 fold increase in risk of heart attack, stroke, abnormal heart rhythm  and death,  increased risk of driving accidents.     Advised to refrain from driving when sleepy.      Recommend weight loss, and maintain and optimal BMI with Exercise 30 minutes most days to target heart rate .     Advised patient to change filters,masks,hoses  and tubes and equiptment on a  regular schedule.    Filters and seals shall be changed every 1 month,  Hoses every 3 months,   CPAP mask and humidifier chamber changed every 6 month  with the durable medical equipment provider.         Meds & Refills for this Visit:  Requested Prescriptions      No prescriptions requested or ordered in this encounter       Outcome: Parent verbalizes understanding. Parent is notified to call with any questions, complications, allergies, or  worsening or changing symptoms.  Parent is to call with any side effects or complications from the treatments as a result of today.     \" This note was created utilizing Dragon speech recognition software.  Please excuse any grammatical errors. Call my office if you have any questions regarding this note. \"     Moses Griggs,   2/24/2025  1:26 PM         [1] No Known Allergies

## 2025-03-18 ENCOUNTER — OFFICE VISIT (OUTPATIENT)
Facility: LOCATION | Age: 25
End: 2025-03-18
Payer: COMMERCIAL

## 2025-03-18 DIAGNOSIS — R06.83 SNORING: Primary | ICD-10-CM

## 2025-03-18 DIAGNOSIS — J32.8 OTHER CHRONIC SINUSITIS: ICD-10-CM

## 2025-03-18 DIAGNOSIS — R09.81 NASAL CONGESTION: ICD-10-CM

## 2025-03-18 PROCEDURE — 99204 OFFICE O/P NEW MOD 45 MIN: CPT | Performed by: OTOLARYNGOLOGY

## 2025-03-18 NOTE — PROGRESS NOTES
Noemi Amezquita is a 25 year old female. No chief complaint on file.    HPI:   She has snoring and sleep apnea.  Is considering CPAP therapy.  She gets a few sinus infections 2-3 each year.  She gets chronic nasal congestion.  She has allergies and she has tried Zyrtec and Flonase.  Current Outpatient Medications   Medication Sig Dispense Refill    metoprolol tartrate 25 MG Oral Tab Take 1 tablet (25 mg total) by mouth 2 (two) times daily. 60 tablet 0    Levonorgestrel (MIRENA, 52 MG,) 20 MCG/DAY Intrauterine IUD 1 Intra Uterine Device by Intrauterine route one time.        Past Medical History:    Anxiety    Arrest of dilation, delivered, current hospitalization (HCC)    Last Assessment & Plan:  Formatting of this note might be different from the original. - Patient remained 6 cm after 6 hours of inadequate contractions    Benign neoplasm of skin of trunk, except scrotum    HERB (generalized anxiety disorder)    Mononucleosis syndrome    Nexplanon in place    Nexplanon placed 5/9/17    Obsessive thinking    Panic attacks    Postpartum anxiety (HCC)    Preeclampsia, severe (HCC)    Last Assessment & Plan:  Formatting of this note might be different from the original. Started on nifedipine 30 mg on POD#1    Traumatic birth    Visual impairment    glasses    Vitamin D deficiency      Social History:  Social History     Socioeconomic History    Marital status: Single   Tobacco Use    Smoking status: Never     Passive exposure: Never    Smokeless tobacco: Never    Tobacco comments:     non-smoker   Vaping Use    Vaping status: Some Days    Substances: Nicotine, Flavoring    Devices: Disposable, Pre-filled or refillable cartridge   Substance and Sexual Activity    Alcohol use: Yes     Comment: soc, 2x month    Drug use: Not Currently     Types: Cannabis     Social Drivers of Health     Food Insecurity: No Food Insecurity (4/26/2024)    Received from [facility]    Food Insecurity     Currently or  in the past 3 months, have you worried your food would run out before you had money to buy more?: No     In the past 12 months, have you run out of food or been unable to get more?: No   Transportation Needs: No Transportation Needs (2022)    [facility], [facility]    Transportation Needs     Currently or in the past 3 months, has lack of transportation kept you from medical appointments, getting food or medicine, or providing care to a family member?: Unrecognized value     Has the lack of transportation kept you from meetings, work, or from getting things needed for daily living?: Unrecognized value     Medical Transportation Needs?: Patient refused     Daily Living Transportation Needs? [Peds Only] : Patient refused    Received from [facility], [facility]    Housing Stability      Past Surgical History:   Procedure Laterality Date          Other surgical history      wisdom teeth    Other surgical history  2023    Excision of pilar cyst         REVIEW OF SYSTEMS:   GENERAL HEALTH: feels well otherwise  GENERAL : denies fever, chills, sweats, weight loss, weight gain  SKIN: denies any unusual skin lesions or rashes  RESPIRATORY: denies shortness of breath with exertion  NEURO: denies headaches    EXAM:   LMP 2024 (Exact Date)     System Findings Details   Constitutional  Overall appearance - Normal.   Psychiatric  Orientation - Oriented to time, place, person & situation. Appropriate mood and affect.   Head/Face  Facial features -- Normal. Skull - Normal.   Eyes  Pupils equal ,round ,react to light and accomidate   Ears, Nose, Throat, Neck  Ears clear nose erythemic congestion with septal deviation oral cavity clear oropharynx +2/4 tonsils neck no masses   Neurological  Memory - Normal. Cranial nerves - Cranial nerves II through XII grossly intact.   Lymph Detail  Submental. Submandibular.  Anterior cervical. Posterior cervical. Supraclavicular.       ASSESSMENT AND PLAN:   1. Snoring  She has mild sleep apnea on sleep study.  She is considering CPAP therapy.    2. Nasal congestion  She has chronic nasal congestion and gets sinus infections.  She does have a deviated nasal septum.  She is going to try Afrin nasal spray for the 5 nights.  She will then monitor for evidence of snoring and sleep disordered breathing.  If the Afrin makes a significant improvement in her breathing then that suggest surgical intervention on the nose could make a difference in her overall nasal congestion nighttime breathing.    3. Other chronic sinusitis  Prior to proceeding with surgical intervention given her history recurrent sinusitis I would do a CT of the sinuses.      The patient indicates understanding of these issues and agrees to the plan.    No follow-ups on file.    Antwan Squires MD  3/18/2025  5:50 PM

## 2025-03-25 ENCOUNTER — TELEPHONE (OUTPATIENT)
Dept: SLEEP CENTER | Age: 25
End: 2025-03-25

## 2025-03-25 ENCOUNTER — PATIENT MESSAGE (OUTPATIENT)
Facility: CLINIC | Age: 25
End: 2025-03-25

## 2025-03-25 NOTE — TELEPHONE ENCOUNTER
----- Message from Anju RDZ sent at 3/25/2025  8:26 AM CDT -----  Dr. Griggs,    This patient has been denied for an in-lab study, insurance recommends an APAP trail.  Please follow up with the patient.    Thank you,  Anju Márquez

## 2025-04-07 ENCOUNTER — MED REC SCAN ONLY (OUTPATIENT)
Facility: CLINIC | Age: 25
End: 2025-04-07

## 2025-05-13 ENCOUNTER — OFFICE VISIT (OUTPATIENT)
Dept: FAMILY MEDICINE CLINIC | Facility: CLINIC | Age: 25
End: 2025-05-13
Payer: COMMERCIAL

## 2025-05-13 VITALS
HEART RATE: 103 BPM | BODY MASS INDEX: 29.7 KG/M2 | TEMPERATURE: 98 F | DIASTOLIC BLOOD PRESSURE: 88 MMHG | RESPIRATION RATE: 16 BRPM | HEIGHT: 68 IN | SYSTOLIC BLOOD PRESSURE: 126 MMHG | OXYGEN SATURATION: 98 % | WEIGHT: 196 LBS

## 2025-05-13 DIAGNOSIS — R05.1 ACUTE COUGH: Primary | ICD-10-CM

## 2025-05-13 DIAGNOSIS — J01.10 ACUTE NON-RECURRENT FRONTAL SINUSITIS: ICD-10-CM

## 2025-05-13 DIAGNOSIS — H66.002 NON-RECURRENT ACUTE SUPPURATIVE OTITIS MEDIA OF LEFT EAR WITHOUT SPONTANEOUS RUPTURE OF TYMPANIC MEMBRANE: ICD-10-CM

## 2025-05-13 PROCEDURE — 3079F DIAST BP 80-89 MM HG: CPT | Performed by: NURSE PRACTITIONER

## 2025-05-13 PROCEDURE — 3008F BODY MASS INDEX DOCD: CPT | Performed by: NURSE PRACTITIONER

## 2025-05-13 PROCEDURE — 3074F SYST BP LT 130 MM HG: CPT | Performed by: NURSE PRACTITIONER

## 2025-05-13 PROCEDURE — 99213 OFFICE O/P EST LOW 20 MIN: CPT | Performed by: NURSE PRACTITIONER

## 2025-05-13 RX ORDER — BENZONATATE 200 MG/1
200 CAPSULE ORAL 2 TIMES DAILY PRN
Qty: 10 CAPSULE | Refills: 0 | Status: SHIPPED | OUTPATIENT
Start: 2025-05-13

## 2025-05-13 RX ORDER — PREDNISONE 10 MG/1
TABLET ORAL
Qty: 20 TABLET | Refills: 0 | Status: SHIPPED | OUTPATIENT
Start: 2025-05-13 | End: 2025-05-21

## 2025-05-13 NOTE — PROGRESS NOTES
CHIEF COMPLAINT:    Chief Complaint   Patient presents with    Sinus Problem     Facial pressure, headaches, runny nose, green mucous, cough        HISTORY OF PRESENT ILLNESS:    Noemi presents today, May 13, 2025, for one health concern.    Sinus pressure  Began 3 weeks ago  Gradually worsening, chest congestion and worsening cough  Sinus pressure of frontal sinuses and bilateral ear aching, left ear worse than right  Has been managing with aleve cold and flu, pearls, nyquil, pushing fluids.  Denies fevers, body aches, or pain with deep breathing    ALLERGIES:  Allergies[1]    CURRENT MEDICATIONS:  Current Medications[2]    MEDICAL HISTORY:  Past Medical History[3]  Past Surgical History[4]  Family History[5]  Family Status   Relation Status    Fa Alive    Mo Alive    Son Alive    MGMA Alive    MGFA Alive    PGMA Alive    PGFA Alive    Sis Alive    Bro Alive     Short Social Hx on File[6]    ROS:  GENERAL:  +HPI  RESPIRATORY:  Denies difficulty breathing  CARDIAC:  Denies chest pain with exertion    VITALS:   /88 (BP Location: Left arm, Patient Position: Sitting)   Pulse 103   Temp 97.8 °F (36.6 °C) (Temporal)   Resp 16   Ht 5' 8\" (1.727 m)   Wt 196 lb (88.9 kg)   LMP 12/06/2024 (Exact Date)   SpO2 98%   BMI 29.80 kg/m²   Reviewed by Alicia Capellan MS, APRN, FNP-BC    PHYSICAL EXAM:    Physical Exam  Constitutional:       General: She is not in acute distress.     Appearance: Normal appearance.   HENT:      Head: Normocephalic and atraumatic.      Right Ear: Tympanic membrane, ear canal and external ear normal.      Left Ear: Ear canal and external ear normal.      Ears:      Comments: Left TM erythematous, cloudy, slightly bulging     Mouth/Throat:      Mouth: Mucous membranes are moist.      Pharynx: Posterior oropharyngeal erythema present.   Cardiovascular:      Rate and Rhythm: Normal rate and regular rhythm.   Pulmonary:      Effort: Pulmonary effort is normal.      Breath sounds: Normal  breath sounds.      Comments: Cough, harsh, dry.  Musculoskeletal:      Cervical back: Neck supple.   Lymphadenopathy:      Cervical: No cervical adenopathy.   Skin:     General: Skin is warm and dry.   Neurological:      General: No focal deficit present.      Mental Status: She is alert and oriented to person, place, and time.   Psychiatric:         Mood and Affect: Mood normal.         Behavior: Behavior normal.         Thought Content: Thought content normal.         Judgment: Judgment normal.        ASSESSMENT & PLAN:    1. Acute cough  - benzonatate 200 MG Oral Cap; Take 1 capsule (200 mg total) by mouth 2 (two) times daily as needed for cough.  Dispense: 10 capsule; Refill: 0    2. Acute non-recurrent frontal sinusitis  - benzonatate 200 MG Oral Cap; Take 1 capsule (200 mg total) by mouth 2 (two) times daily as needed for cough.  Dispense: 10 capsule; Refill: 0  - predniSONE 10 MG Oral Tab; Take 4 tablets (40 mg total) by mouth daily for 2 days, THEN 3 tablets (30 mg total) daily for 2 days, THEN 2 tablets (20 mg total) daily for 2 days, THEN 1 tablet (10 mg total) daily for 2 days. Take in the mornings to avoid interference with sleep quality.  Dispense: 20 tablet; Refill: 0  - amoxicillin clavulanate 875-125 MG Oral Tab; Take 1 tablet by mouth 2 (two) times daily for 10 days.  Dispense: 20 tablet; Refill: 0    Follow-up 5 days if failure to improve       [1] No Known Allergies  [2]   Current Outpatient Medications   Medication Sig Dispense Refill    benzonatate 200 MG Oral Cap Take 1 capsule (200 mg total) by mouth 2 (two) times daily as needed for cough. 10 capsule 0    predniSONE 10 MG Oral Tab Take 4 tablets (40 mg total) by mouth daily for 2 days, THEN 3 tablets (30 mg total) daily for 2 days, THEN 2 tablets (20 mg total) daily for 2 days, THEN 1 tablet (10 mg total) daily for 2 days. Take in the mornings to avoid interference with sleep quality. 20 tablet 0    amoxicillin clavulanate 875-125 MG Oral  Tab Take 1 tablet by mouth 2 (two) times daily for 10 days. 20 tablet 0    Levonorgestrel (MIRENA, 52 MG,) 20 MCG/DAY Intrauterine IUD 1 Intra Uterine Device by Intrauterine route one time.      metoprolol tartrate 25 MG Oral Tab Take 1 tablet (25 mg total) by mouth 2 (two) times daily. (Patient not taking: Reported on 2025) 60 tablet 0   [3]   Past Medical History:   Anxiety    Arrest of dilation, delivered, current hospitalization (HCC)    Last Assessment & Plan:  Formatting of this note might be different from the original. - Patient remained 6 cm after 6 hours of inadequate contractions    Benign neoplasm of skin of trunk, except scrotum    HERB (generalized anxiety disorder)    Mononucleosis syndrome    Nexplanon in place    Nexplanon placed 17    Obsessive thinking    Panic attacks    Postpartum anxiety (HCC)    Preeclampsia, severe (HCC)    Last Assessment & Plan:  Formatting of this note might be different from the original. Started on nifedipine 30 mg on POD#1    Traumatic birth    Visual impairment    glasses    Vitamin D deficiency   [4]   Past Surgical History:  Procedure Laterality Date          Other surgical history      wisdom teeth    Other surgical history  2023    Excision of pilar cyst   [5]   Family History  Problem Relation Age of Onset    Anxiety Mother     Heart Disease Maternal Grandfather     Stroke Paternal Grandfather    [6]   Social History  Socioeconomic History    Marital status: Single   Tobacco Use    Smoking status: Never     Passive exposure: Never    Smokeless tobacco: Never    Tobacco comments:     non-smoker   Vaping Use    Vaping status: Some Days    Substances: Nicotine, Flavoring    Devices: Disposable, Pre-filled or refillable cartridge   Substance and Sexual Activity    Alcohol use: Yes     Comment: soc, 2x month    Drug use: Not Currently     Types: Cannabis     Social Drivers of Health     Food Insecurity: No Food Insecurity (2024)    Received  from [facility]    Food Insecurity     Currently or in the past 3 months, have you worried your food would run out before you had money to buy more?: No     In the past 12 months, have you run out of food or been unable to get more?: No   Transportation Needs: No Transportation Needs (11/18/2022)    Received from [facility]    Transportation Needs     Currently or in the past 3 months, has lack of transportation kept you from medical appointments, getting food or medicine, or providing care to a family member?: Patient Refused     Has the lack of transportation kept you from meetings, work, or from getting things needed for daily living?: Patient Refused     Medical Transportation Needs?: Patient refused     Daily Living Transportation Needs? [Peds Only] : Patient refused    Received from [facility]    Housing Stability

## 2025-05-27 DIAGNOSIS — F41.0 PANIC ATTACKS: ICD-10-CM

## 2025-05-27 DIAGNOSIS — R03.0 ELEVATED BLOOD PRESSURE READING: ICD-10-CM

## 2025-05-27 DIAGNOSIS — R00.2 PALPITATIONS: ICD-10-CM

## 2025-05-27 DIAGNOSIS — F41.1 GAD (GENERALIZED ANXIETY DISORDER): ICD-10-CM

## 2025-05-27 DIAGNOSIS — R51.9 INTERMITTENT HEADACHE: ICD-10-CM

## 2025-05-27 RX ORDER — METOPROLOL TARTRATE 25 MG/1
25 TABLET, FILM COATED ORAL 2 TIMES DAILY
Qty: 60 TABLET | Refills: 0 | Status: SHIPPED | OUTPATIENT
Start: 2025-05-27

## 2025-05-27 NOTE — TELEPHONE ENCOUNTER
Hypertension Medications Protocol [id number]/27/2025 01:16 PM   Protocol Details CMP or BMP in past 12 months    Last BP reading less than 140/90    In person appointment or virtual visit in the past 12 mos or appointment in next 3 mos    EGFRCR or GFRNAA > 50    Medication is active on med list

## 2025-05-27 NOTE — TELEPHONE ENCOUNTER
PATIENT CALLING- NEEDS REFILL ON metoprolol tartrate 25 MG Oral Tab.       [id number] - [place], [place] - [address] AT [facility] [address], [phone], [phone]

## 2025-06-05 ENCOUNTER — OFFICE VISIT (OUTPATIENT)
Facility: LOCATION | Age: 25
End: 2025-06-05
Payer: COMMERCIAL

## 2025-06-05 DIAGNOSIS — J34.2 DEVIATED SEPTUM: ICD-10-CM

## 2025-06-05 DIAGNOSIS — G47.33 OSA (OBSTRUCTIVE SLEEP APNEA): ICD-10-CM

## 2025-06-05 DIAGNOSIS — J32.4 CHRONIC PANSINUSITIS: Primary | ICD-10-CM

## 2025-06-05 PROCEDURE — 77011 CT SCAN FOR LOCALIZATION: CPT | Performed by: OTOLARYNGOLOGY

## 2025-06-05 PROCEDURE — 99214 OFFICE O/P EST MOD 30 MIN: CPT | Performed by: OTOLARYNGOLOGY

## 2025-06-05 RX ORDER — PREDNISONE 5 MG/1
5 TABLET ORAL DAILY
Qty: 15 TABLET | Refills: 0 | Status: SHIPPED | OUTPATIENT
Start: 2025-06-05

## 2025-06-05 RX ORDER — CEFUROXIME AXETIL 250 MG/1
250 TABLET ORAL 2 TIMES DAILY
Qty: 30 TABLET | Refills: 0 | Status: SHIPPED | OUTPATIENT
Start: 2025-06-05

## 2025-06-05 NOTE — PROGRESS NOTES
Noemi Amezquita is a 25 year old female. No chief complaint on file.    HPI:   She has a history of chronic sinusitis and chronic nasal airway obstruction.  Every time she gets a cold she gets a sinus infection.  Once again she was on 2 rounds of antibiotic and prednisone recently.  She has tried Zyrtec and Flonase without relief.  She also has sleep apnea and is considering a CPAP machine but she says it is quite expensive through her insurance.    REVIEW OF SYSTEMS:   GENERAL HEALTH: feels well otherwise  GENERAL : denies fever, chills, sweats, weight loss, weight gain  SKIN: denies any unusual skin lesions or rashes  RESPIRATORY: denies shortness of breath with exertion  NEURO: denies headaches    EXAM:   LMP 12/06/2024 (Exact Date)     System Findings Details   Constitutional  Overall appearance - Normal.   Psychiatric  Orientation - Oriented to time, place, person & situation. Appropriate mood and affect.   Head/Face  Facial features -- Normal. Skull - Normal.   Eyes  Pupils equal ,round ,react to light and accomidate   Ears, Nose, Throat, Neck  Nose reveals a narrow nose with severely deviated septum to the right and turbinate hypertrophy and erythema   Neurological  Memory - Normal. Cranial nerves - Cranial nerves II through XII grossly intact.   Lymph Detail  Submental. Submandibular. Anterior cervical. Posterior cervical. Supraclavicular.   Clinical indication: Chronic sinusitis    Exam title: CT guidance stereotactic localization of sinuses    Technique: ASR on mini CAT was used with a sinus CT protocol.  The patient was positioned seated upright with the head aligned and supported with malar retention.  A  film was used to ensure proper targeting.  Volume CT data was acquired.  Multiplanar reconstruction was performed on a viewing work stand to obtain axial and coronal images.  Images were manipulated to adjust contrast for bone window viewing.    Scan time: 20 seconds    Peak voltage: 120 K      Tube current: 48.30 MAS    Comparison to prior CT scans: None    Findings:  Frontal sinuses are clear.  There is mild mucosal thickening at the maxillary sinuses especially at the ostiomeatal unit which is causing outflow obstruction.  There is mild mucosal thickening ethmoid sinuses however there is moderate mucosal thickening in the posterior ethmoid air cells on the left.  The sphenoid sinus are clear.  There is severe septal deviation to the right with turbinate hypertrophy left greater than right  Diagnosis:  Mild to moderate chronic sinus disease with ostiomeatal unit obstruction deviated nasal septum    ASSESSMENT AND PLAN:   1. Chronic pansinusitis  CT of the sinus reviewed with the patient.  This shows sinusitis along with ostiomeatal obstruction and deviated septum.  She has had significant problems over the years.  She cannot breathe at all out of the right side of her nose.  Will plan for endoscopic sinus surgery to include bilateral maxillary antrostomies bilateral ethmoidectomies septoplasty and reduction of the turbinates.The risk benefits and alternatives of sinus surgery were explained to the patient.  The risks are to include but not limited to bleeding infection ocular brain injury scar band formation septal perforation and nonresolution of symptoms.      2. Deviated septum  5 days prior to surgery she will start on antibiotic and prednisone.    3. ANGELA (obstructive sleep apnea)  She is considering CPAP therapy.      The patient indicates understanding of these issues and agrees to the plan.    No follow-ups on file.    Antwan Squires MD  6/5/2025  5:01 PM

## 2025-06-09 ENCOUNTER — TELEPHONE (OUTPATIENT)
Facility: LOCATION | Age: 25
End: 2025-06-09

## 2025-06-09 DIAGNOSIS — J32.2 CHRONIC ETHMOIDAL SINUSITIS: Primary | ICD-10-CM

## 2025-06-09 DIAGNOSIS — J32.4 CHRONIC PANSINUSITIS: ICD-10-CM

## 2025-06-09 DIAGNOSIS — J32.0 CHRONIC MAXILLARY SINUSITIS: ICD-10-CM

## 2025-06-09 DIAGNOSIS — J34.3 HYPERTROPHY OF NASAL TURBINATES: ICD-10-CM

## 2025-06-09 DIAGNOSIS — I10 HYPERTENSION, UNSPECIFIED TYPE: Primary | ICD-10-CM

## 2025-06-09 DIAGNOSIS — J32.3 CHRONIC SPHENOIDAL SINUSITIS: ICD-10-CM

## 2025-06-09 DIAGNOSIS — J34.2 DEVIATED NASAL SEPTUM: ICD-10-CM

## 2025-06-09 DIAGNOSIS — J32.8 OTHER CHRONIC SINUSITIS: ICD-10-CM

## 2025-07-03 ENCOUNTER — EKG ENCOUNTER (OUTPATIENT)
Dept: LAB | Age: 25
End: 2025-07-03
Attending: OTOLARYNGOLOGY
Payer: COMMERCIAL

## 2025-07-03 ENCOUNTER — LAB ENCOUNTER (OUTPATIENT)
Dept: LAB | Age: 25
End: 2025-07-03
Attending: OTOLARYNGOLOGY
Payer: COMMERCIAL

## 2025-07-03 DIAGNOSIS — I10 HYPERTENSION, UNSPECIFIED TYPE: ICD-10-CM

## 2025-07-03 LAB
ALBUMIN SERPL-MCNC: 5 G/DL (ref 3.2–4.8)
ALBUMIN/GLOB SERPL: 1.7 {RATIO} (ref 1–2)
ALP LIVER SERPL-CCNC: 67 U/L (ref 37–98)
ALT SERPL-CCNC: 16 U/L (ref 10–49)
ANION GAP SERPL CALC-SCNC: 9 MMOL/L (ref 0–18)
AST SERPL-CCNC: 16 U/L (ref ?–34)
BILIRUB SERPL-MCNC: 1.5 MG/DL (ref 0.3–1.2)
BUN BLD-MCNC: 9 MG/DL (ref 9–23)
CALCIUM BLD-MCNC: 9.8 MG/DL (ref 8.7–10.6)
CHLORIDE SERPL-SCNC: 102 MMOL/L (ref 98–112)
CO2 SERPL-SCNC: 27 MMOL/L (ref 21–32)
CREAT BLD-MCNC: 0.86 MG/DL (ref 0.55–1.02)
EGFRCR SERPLBLD CKD-EPI 2021: 95 ML/MIN/1.73M2 (ref 60–?)
GLOBULIN PLAS-MCNC: 3 G/DL (ref 2–3.5)
GLUCOSE BLD-MCNC: 76 MG/DL (ref 70–99)
OSMOLALITY SERPL CALC.SUM OF ELEC: 283 MOSM/KG (ref 275–295)
POTASSIUM SERPL-SCNC: 4 MMOL/L (ref 3.5–5.1)
PROT SERPL-MCNC: 8 G/DL (ref 5.7–8.2)
SODIUM SERPL-SCNC: 138 MMOL/L (ref 136–145)

## 2025-07-03 PROCEDURE — 93010 ELECTROCARDIOGRAM REPORT: CPT | Performed by: INTERNAL MEDICINE

## 2025-07-03 PROCEDURE — 93005 ELECTROCARDIOGRAM TRACING: CPT

## 2025-07-03 PROCEDURE — 80053 COMPREHEN METABOLIC PANEL: CPT | Performed by: OTOLARYNGOLOGY

## 2025-07-04 LAB
ATRIAL RATE: 77 BPM
P AXIS: 56 DEGREES
P-R INTERVAL: 174 MS
Q-T INTERVAL: 368 MS
QRS DURATION: 88 MS
QTC CALCULATION (BEZET): 416 MS
R AXIS: 13 DEGREES
T AXIS: 43 DEGREES
VENTRICULAR RATE: 77 BPM

## 2025-07-08 ENCOUNTER — APPOINTMENT (OUTPATIENT)
Dept: ADMINISTRATIVE | Facility: HOSPITAL | Age: 25
End: 2025-07-08
Payer: COMMERCIAL

## 2025-07-08 RX ORDER — DICYCLOMINE HCL 20 MG
20 TABLET ORAL 2 TIMES DAILY PRN
COMMUNITY

## 2025-07-16 NOTE — H&P
[facility]  History & Physical    Noemi Amezquita Patient Status:  Hospital Outpatient Surgery    2000 MRN [id number]   Location [facility] SURGERY Attending Antwan Squires MD   Hosp Day # 0 PCP Wilmer Moralez MD     History of Present Illness:  Noemi Amezquita is a(n) 25 year old female.  Patient with chronic sinusitis and chronic nasal airway obstruction.    History:  Past Medical History[1]  Past Surgical History[2]  Family History[3]   reports that she has never smoked. She has never been exposed to tobacco smoke. She has never used smokeless tobacco. She reports current alcohol use. She reports that she does not currently use drugs after having used the following drugs: Cannabis.    Allergies:  Allergies[4]    Home Medications:  Prescriptions Prior to Admission[5]    Physical Exam:   General: Alert, orientated x3.  Cooperative.  No apparent distress.  Vital Signs:  Ht 5' 8\" (1.727 m)   Wt 198 lb (89.8 kg)   LMP 2025 (Exact Date)   BMI 30.11 kg/m²   HEENT narrow nasal vault with severely deviated septum to the right and turbinate hypertrophy  Neck: No tenderness to palpitation.  Full range of motion to flexion and extension, lateral rotation and lateral flexion of cervical spine.  No JVD. Supple.   Lungs: Clear to auscultation bilaterally.  Cardiac: Regular rate and rhythm. No murmur.  Abdomen:  Soft, non-distended, non-tender, with no rebound or guarding.  No peritoneal signs. No ascites.  Liver is within normal limits.  Spleen is not palpable.    Extremities:  No lower extremity edema noted.  Without clubbing or cyanosis.    Skin: Normal texture and turgor.  Lymphatic:  No palpable cervical lymphadenopathy.  Neurologic: Cranial nerves are grossly intact.  Motor strength and sensory examination is grossly normal.  No focal neurologic deficit.    Laboratory Data:      Impression and Plan:  Problem List[6]  CT sinus shows mild to moderate chronic sinus disease with  ostiomeatal unit obstruction and deviated septum    Chronic sinusitis with deviated septum in a patient with sleep apnea    Bilateral endoscopic maxillary antrostomies ethmoidectomies septoplasty and reduction of the turbinates        Antwan Squires MD  2025  8:08 AM         [1]   Past Medical History:   Anxiety    Arrest of dilation, delivered, current hospitalization (HCC)    Last Assessment & Plan:  Formatting of this note might be different from the original. - Patient remained 6 cm after 6 hours of inadequate contractions    Benign neoplasm of skin of trunk, except scrotum    Esophageal reflux    HERB (generalized anxiety disorder)    High blood pressure    Mononucleosis syndrome    Nexplanon in place    Nexplanon placed 17    Obsessive thinking    Panic attacks    Postpartum anxiety (HCC)    Preeclampsia, severe (HCC)    Last Assessment & Plan:  Formatting of this note might be different from the original. Started on nifedipine 30 mg on POD#1    Traumatic birth    Visual impairment    glasses    Vitamin D deficiency   [2]   Past Surgical History:  Procedure Laterality Date          Other surgical history      wisdom teeth    Other surgical history  2023    Excision of pilar cyst   [3]   Family History  Problem Relation Age of Onset    Anxiety Mother     Heart Disease Maternal Grandfather     Stroke Paternal Grandfather    [4] No Known Allergies  [5]   No medications prior to admission.   [6]   Patient Active Problem List  Diagnosis    Vitamin D deficiency    HERB (generalized anxiety disorder)    Obesity (BMI 30-39.9)    Postpartum anxiety (HCC)    Traumatic birth    Obsessive thinking    Panic attacks    Adjustment insomnia    Panic    Diarrhea    Dyspepsia    Snoring    ANGELA (obstructive sleep apnea)    Hypersomnia    Class 1 obesity due to excess calories without serious comorbidity with body mass index (BMI) of 30.0 to 30.9 in adult

## 2025-07-21 ENCOUNTER — ANESTHESIA EVENT (OUTPATIENT)
Dept: SURGERY | Facility: HOSPITAL | Age: 25
End: 2025-07-21
Payer: COMMERCIAL

## 2025-07-21 ENCOUNTER — HOSPITAL ENCOUNTER (OUTPATIENT)
Facility: HOSPITAL | Age: 25
Setting detail: HOSPITAL OUTPATIENT SURGERY
Discharge: HOME | End: 2025-07-21
Attending: OTOLARYNGOLOGY | Admitting: OTOLARYNGOLOGY
Payer: COMMERCIAL

## 2025-07-21 ENCOUNTER — ANESTHESIA (OUTPATIENT)
Dept: SURGERY | Facility: HOSPITAL | Age: 25
End: 2025-07-21
Payer: COMMERCIAL

## 2025-07-21 VITALS
DIASTOLIC BLOOD PRESSURE: 91 MMHG | OXYGEN SATURATION: 97 % | BODY MASS INDEX: 31.07 KG/M2 | SYSTOLIC BLOOD PRESSURE: 130 MMHG | RESPIRATION RATE: 16 BRPM | WEIGHT: 205 LBS | HEART RATE: 78 BPM | TEMPERATURE: 97 F | HEIGHT: 68 IN

## 2025-07-21 DIAGNOSIS — J32.2 CHRONIC ETHMOIDAL SINUSITIS: ICD-10-CM

## 2025-07-21 DIAGNOSIS — J32.3 CHRONIC SPHENOIDAL SINUSITIS: ICD-10-CM

## 2025-07-21 DIAGNOSIS — J34.3 HYPERTROPHY OF NASAL TURBINATES: ICD-10-CM

## 2025-07-21 DIAGNOSIS — J34.2 DEVIATED NASAL SEPTUM: ICD-10-CM

## 2025-07-21 DIAGNOSIS — J32.4 CHRONIC PANSINUSITIS: ICD-10-CM

## 2025-07-21 DIAGNOSIS — J32.8 OTHER CHRONIC SINUSITIS: ICD-10-CM

## 2025-07-21 DIAGNOSIS — J32.0 CHRONIC MAXILLARY SINUSITIS: ICD-10-CM

## 2025-07-21 LAB — B-HCG UR QL: NEGATIVE

## 2025-07-21 PROCEDURE — 30140 RESECT INFERIOR TURBINATE: CPT | Performed by: OTOLARYNGOLOGY

## 2025-07-21 PROCEDURE — 31255 NSL/SINS NDSC W/TOT ETHMDCT: CPT | Performed by: OTOLARYNGOLOGY

## 2025-07-21 PROCEDURE — 61782 SCAN PROC CRANIAL EXTRA: CPT | Performed by: OTOLARYNGOLOGY

## 2025-07-21 PROCEDURE — 30520 REPAIR OF NASAL SEPTUM: CPT | Performed by: OTOLARYNGOLOGY

## 2025-07-21 PROCEDURE — 31267 ENDOSCOPY MAXILLARY SINUS: CPT | Performed by: OTOLARYNGOLOGY

## 2025-07-21 RX ORDER — HYDROMORPHONE HYDROCHLORIDE 1 MG/ML
0.4 INJECTION, SOLUTION INTRAMUSCULAR; INTRAVENOUS; SUBCUTANEOUS EVERY 5 MIN PRN
Status: DISCONTINUED | OUTPATIENT
Start: 2025-07-21 | End: 2025-07-21

## 2025-07-21 RX ORDER — SODIUM CHLORIDE, SODIUM LACTATE, POTASSIUM CHLORIDE, CALCIUM CHLORIDE 600; 310; 30; 20 MG/100ML; MG/100ML; MG/100ML; MG/100ML
INJECTION, SOLUTION INTRAVENOUS CONTINUOUS
Status: DISCONTINUED | OUTPATIENT
Start: 2025-07-21 | End: 2025-07-21

## 2025-07-21 RX ORDER — ONDANSETRON 2 MG/ML
INJECTION INTRAMUSCULAR; INTRAVENOUS AS NEEDED
Status: DISCONTINUED | OUTPATIENT
Start: 2025-07-21 | End: 2025-07-21 | Stop reason: SURG

## 2025-07-21 RX ORDER — NALOXONE HYDROCHLORIDE 0.4 MG/ML
0.08 INJECTION, SOLUTION INTRAMUSCULAR; INTRAVENOUS; SUBCUTANEOUS AS NEEDED
Status: DISCONTINUED | OUTPATIENT
Start: 2025-07-21 | End: 2025-07-21

## 2025-07-21 RX ORDER — HYDROMORPHONE HYDROCHLORIDE 1 MG/ML
0.6 INJECTION, SOLUTION INTRAMUSCULAR; INTRAVENOUS; SUBCUTANEOUS EVERY 5 MIN PRN
Status: DISCONTINUED | OUTPATIENT
Start: 2025-07-21 | End: 2025-07-21

## 2025-07-21 RX ORDER — DIPHENHYDRAMINE HYDROCHLORIDE 50 MG/ML
12.5 INJECTION, SOLUTION INTRAMUSCULAR; INTRAVENOUS AS NEEDED
Status: DISCONTINUED | OUTPATIENT
Start: 2025-07-21 | End: 2025-07-21

## 2025-07-21 RX ORDER — SCOPOLAMINE 1 MG/3D
1 PATCH, EXTENDED RELEASE TRANSDERMAL ONCE
Status: DISCONTINUED | OUTPATIENT
Start: 2025-07-21 | End: 2025-07-21 | Stop reason: HOSPADM

## 2025-07-21 RX ORDER — HYDROCODONE BITARTRATE AND ACETAMINOPHEN 5; 325 MG/1; MG/1
1 TABLET ORAL ONCE AS NEEDED
Status: COMPLETED | OUTPATIENT
Start: 2025-07-21 | End: 2025-07-21

## 2025-07-21 RX ORDER — ACETAMINOPHEN 500 MG
1000 TABLET ORAL ONCE
Status: DISCONTINUED | OUTPATIENT
Start: 2025-07-21 | End: 2025-07-21 | Stop reason: HOSPADM

## 2025-07-21 RX ORDER — ACETAMINOPHEN 500 MG
1000 TABLET ORAL ONCE AS NEEDED
Status: COMPLETED | OUTPATIENT
Start: 2025-07-21 | End: 2025-07-21

## 2025-07-21 RX ORDER — HYDROCODONE BITARTRATE AND ACETAMINOPHEN 5; 325 MG/1; MG/1
2 TABLET ORAL ONCE AS NEEDED
Status: COMPLETED | OUTPATIENT
Start: 2025-07-21 | End: 2025-07-21

## 2025-07-21 RX ORDER — HYDROMORPHONE HYDROCHLORIDE 1 MG/ML
0.2 INJECTION, SOLUTION INTRAMUSCULAR; INTRAVENOUS; SUBCUTANEOUS EVERY 5 MIN PRN
Status: DISCONTINUED | OUTPATIENT
Start: 2025-07-21 | End: 2025-07-21

## 2025-07-21 RX ORDER — LIDOCAINE HYDROCHLORIDE AND EPINEPHRINE 10; 10 MG/ML; UG/ML
INJECTION, SOLUTION INFILTRATION; PERINEURAL AS NEEDED
Status: DISCONTINUED | OUTPATIENT
Start: 2025-07-21 | End: 2025-07-21 | Stop reason: HOSPADM

## 2025-07-21 RX ORDER — LIDOCAINE HYDROCHLORIDE 10 MG/ML
INJECTION, SOLUTION EPIDURAL; INFILTRATION; INTRACAUDAL; PERINEURAL AS NEEDED
Status: DISCONTINUED | OUTPATIENT
Start: 2025-07-21 | End: 2025-07-21 | Stop reason: SURG

## 2025-07-21 RX ORDER — MEPERIDINE HYDROCHLORIDE 25 MG/ML
12.5 INJECTION INTRAMUSCULAR; INTRAVENOUS; SUBCUTANEOUS AS NEEDED
Status: DISCONTINUED | OUTPATIENT
Start: 2025-07-21 | End: 2025-07-21

## 2025-07-21 RX ORDER — DEXAMETHASONE SODIUM PHOSPHATE 4 MG/ML
VIAL (ML) INJECTION AS NEEDED
Status: DISCONTINUED | OUTPATIENT
Start: 2025-07-21 | End: 2025-07-21 | Stop reason: SURG

## 2025-07-21 RX ORDER — HYDROCODONE BITARTRATE AND ACETAMINOPHEN 5; 325 MG/1; MG/1
1-2 TABLET ORAL EVERY 4 HOURS PRN
Qty: 15 TABLET | Refills: 0 | Status: SHIPPED | OUTPATIENT
Start: 2025-07-21

## 2025-07-21 RX ORDER — SCOPOLAMINE 1 MG/3D
1 PATCH, EXTENDED RELEASE TRANSDERMAL
Status: DISCONTINUED | OUTPATIENT
Start: 2025-07-21 | End: 2025-07-21

## 2025-07-21 RX ORDER — PROCHLORPERAZINE EDISYLATE 5 MG/ML
5 INJECTION INTRAMUSCULAR; INTRAVENOUS EVERY 8 HOURS PRN
Status: DISCONTINUED | OUTPATIENT
Start: 2025-07-21 | End: 2025-07-21

## 2025-07-21 RX ORDER — ONDANSETRON 2 MG/ML
4 INJECTION INTRAMUSCULAR; INTRAVENOUS EVERY 6 HOURS PRN
Status: DISCONTINUED | OUTPATIENT
Start: 2025-07-21 | End: 2025-07-21

## 2025-07-21 RX ORDER — ROCURONIUM BROMIDE 10 MG/ML
INJECTION, SOLUTION INTRAVENOUS AS NEEDED
Status: DISCONTINUED | OUTPATIENT
Start: 2025-07-21 | End: 2025-07-21 | Stop reason: SURG

## 2025-07-21 RX ORDER — HYDROMORPHONE HYDROCHLORIDE 1 MG/ML
INJECTION, SOLUTION INTRAMUSCULAR; INTRAVENOUS; SUBCUTANEOUS
Status: COMPLETED
Start: 2025-07-21 | End: 2025-07-21

## 2025-07-21 RX ORDER — MIDAZOLAM HYDROCHLORIDE 1 MG/ML
1 INJECTION INTRAMUSCULAR; INTRAVENOUS EVERY 5 MIN PRN
Status: DISCONTINUED | OUTPATIENT
Start: 2025-07-21 | End: 2025-07-21

## 2025-07-21 RX ORDER — SCOPOLAMINE 1 MG/3D
PATCH, EXTENDED RELEASE TRANSDERMAL
Status: COMPLETED
Start: 2025-07-21 | End: 2025-07-23

## 2025-07-21 RX ORDER — PROCHLORPERAZINE EDISYLATE 5 MG/ML
INJECTION INTRAMUSCULAR; INTRAVENOUS
Status: COMPLETED
Start: 2025-07-21 | End: 2025-07-21

## 2025-07-21 RX ORDER — ONDANSETRON 2 MG/ML
INJECTION INTRAMUSCULAR; INTRAVENOUS
Status: COMPLETED
Start: 2025-07-21 | End: 2025-07-21

## 2025-07-21 RX ADMIN — DEXAMETHASONE SODIUM PHOSPHATE 8 MG: 4 MG/ML VIAL (ML) INJECTION at 11:14:00

## 2025-07-21 RX ADMIN — ONDANSETRON 4 MG: 2 INJECTION INTRAMUSCULAR; INTRAVENOUS at 11:15:00

## 2025-07-21 RX ADMIN — ROCURONIUM BROMIDE 30 MG: 10 INJECTION, SOLUTION INTRAVENOUS at 11:14:00

## 2025-07-21 RX ADMIN — SODIUM CHLORIDE, SODIUM LACTATE, POTASSIUM CHLORIDE, CALCIUM CHLORIDE: 600; 310; 30; 20 INJECTION, SOLUTION INTRAVENOUS at 11:10:00

## 2025-07-21 RX ADMIN — LIDOCAINE HYDROCHLORIDE 100 MG: 10 INJECTION, SOLUTION EPIDURAL; INFILTRATION; INTRACAUDAL; PERINEURAL at 11:14:00

## 2025-07-21 NOTE — DISCHARGE INSTRUCTIONS
[address] [facility].  Antwan Squires MD                                                  [place]  [address]                                                           [phone]      Sinus Surgery Instructions     What to expect after your sinus surgery:    Pain - Headache and/or pressure are normal after sinus surgery.    Nasal bleeding  - There will be some bleeding from your nose after surgery.  A gauze dressing will be placed under your nose to absorb any blood.  It may need to be changed as frequently as every 10-15 minutes for 2-3 hours after surgery and then less frequently for the first 24 hours after surgery.  Any bright red bleeding that is heavy and continuous, requiring changing of the dressing every five minutes, should be reported to the doctor immediately.    Stuffiness - It is normal for your head and nasal passages to feel stuffy after surgery. This will gradually decrease over the next few weeks, and your nasal breathing will improve.    Packing:  Absorbable packing is often placed in key areas within the nose during surgery.  This gelatinous packing dissolves over time, or is flushed out with saline spray/irrigation.    Activity:  Rest for a few days after surgery.   No heavy lifting or vigorous physical activity for 2 weeks after surgery (this includes jogging, swimming, or other aerobic exercise).   If you need to blow your nose, do so gently.    If you need to sneeze, do so with your mouth open.   Keep your head elevated on pillows when resting.    Medication:  It is important to fill all prescriptions written by your surgeon.   These may include:   -Pain medication  -Antibiotic  You may also use over -the-counter Tylenol (acetaminophen) INSTEAD OF your prescription, but DO NOT take aspirin, any medication containing aspirin, or any NSAID medication (ibuprofen, Motrin, Advil, Aleve, naproxen, Celebrex, etc.), as these medications may cause  bleeding after surgery.         Saline spray / rinse:    Keeping the nose moist after sinus surgery is EXTREMELY IMPORTANT for the healing process.      You should use nasal saline spray (Ayr or similar) every 1-2 hours while you’re awake during the first week after surgery.    You should begin saline irrigations 24 hours after surgery.  Use the [employer]Med Sinus Rinse kit  or something similar such as a Netti pot twice daily (this can be purchased over-the-counter at any pharmacy). You can continue on nasal saline spray many times per day in between the irrigations.       Follow-up:  Call to make an appointment to see your surgeon 1 week after surgery.  Patients are then typically seen at one week intervals for one or two more weeks.    It is most important for a successful outcome that you come to all post-operative appointments, at which time the sinus cavities will be cleaned.  These cleanings help prevent scarring and will aid in your return to healthier sinuses.

## 2025-07-21 NOTE — ANESTHESIA PREPROCEDURE EVALUATION
PRE-OP EVALUATION    Patient Name: Noemi Amezquita    Admit Diagnosis: Chronic ethmoidal sinusitis [J32.2]  Chronic sphenoidal sinusitis [J32.3]  Chronic maxillary sinusitis [J32.0]  Deviated nasal septum [J34.2]  Hypertrophy of nasal turbinates [J34.3]  Chronic pansinusitis [J32.4]  Other chronic sinusitis [J32.8]    Pre-op Diagnosis: Chronic ethmoidal sinusitis [J32.2]  Chronic sphenoidal sinusitis [J32.3]  Chronic maxillary sinusitis [J32.0]  Deviated nasal septum [J34.2]  Hypertrophy of nasal turbinates [J34.3]  Chronic pansinusitis [J32.4]  Other chronic sinusitis [J32.8]    Bilateral Sinus Endoscopic Ethmoidectomy; Bilateral Maxillary Antrostomy with Tissue Removal; Nasal Septoplasty; Bilateral Out Fracture of Inferior Turbinates; Bilateral Submucous Resection of Inferior Turbinates; Medtronic ENT Navigation Stealth    Anesthesia Procedure: Bilateral Sinus Endoscopic Ethmoidectomy; Bilateral Maxillary Antrostomy with Tissue Removal; Nasal Septoplasty; Bilateral Out Fracture of Inferior Turbinates; Bilateral Submucous Resection of Inferior Turbinates; Medtronic ENT Navigation Stealth (Bilateral: Nose)  . (Bilateral: Nose)    Surgeons and Role:     * Antwan Squires MD - Primary    Pre-op vitals reviewed.  Temp: 97.8 °F (36.6 °C)  Pulse: 72  Resp: 16  BP: 129/95  SpO2: 98 %  Body mass index is 31.17 kg/m².    Current medications reviewed.  Hospital Medications:  Current Medications[1]    Outpatient Medications:   Prescriptions Prior to Admission[2]    Allergies: Patient has no known allergies.      Anesthesia Evaluation    Patient summary reviewed.    Anesthetic Complications           GI/Hepatic/Renal      (+) GERD                           Cardiovascular                  (+) hypertension                                     Endo/Other                                  Pulmonary                    (+) sleep apnea       Neuro/Psych                                      Past Surgical History[3]  Social Hx  on file[4]  History   Drug Use Unknown     Available pre-op labs reviewed.     Lab Results   Component Value Date     2025    K 4.0 2025     2025    CO2 27.0 2025    BUN 9 2025    CREATSERUM 0.86 2025    GLU 76 2025    CA 9.8 2025            Airway      Mallampati: II  Mouth opening: >3 FB  TM distance: > 6 cm  Neck ROM: full Cardiovascular    Cardiovascular exam normal.         Dental             Pulmonary    Pulmonary exam normal.                 Other findings              ASA: 2   Plan: general  NPO status verified and           Plan/risks discussed with: patient                Present on Admission:  **None**             [1]    [Transfer Hold] acetaminophen (Tylenol Extra Strength) tab 1,000 mg  1,000 mg Oral Once    [Transfer Hold] scopolamine (Transderm-Scop) 1 MG/3DAYS patch 1 patch  1 patch Transdermal Once    lactated ringers infusion   Intravenous Continuous   [2]   Medications Prior to Admission   Medication Sig Dispense Refill Last Dose/Taking    dicyclomine 20 MG Oral Tab Take 1 tablet (20 mg total) by mouth 2 (two) times daily as needed.   Past Month    cefuroxime 250 MG Oral Tab Take 1 tablet (250 mg total) by mouth 2 (two) times daily. Start 5 days prior to surgery 30 tablet 0 2025    predniSONE 5 MG Oral Tab Take 1 tablet (5 mg total) by mouth daily. Start 5 days prior to surgery 15 tablet 0 2025    metoprolol tartrate 25 MG Oral Tab Take 1 tablet (25 mg total) by mouth 2 (two) times daily. 60 tablet 0 2025    Levonorgestrel (MIRENA, 52 MG,) 20 MCG/DAY Intrauterine IUD 1 Intra Uterine Device by Intrauterine route one time.   Taking    benzonatate 200 MG Oral Cap Take 1 capsule (200 mg total) by mouth 2 (two) times daily as needed for cough. 10 capsule 0 More than a month   [3]   Past Surgical History:  Procedure Laterality Date          Other surgical history      wisdom teeth    Other surgical history  2023     Excision of pilar cyst   [4]   Social History  Socioeconomic History    Marital status: Single   Tobacco Use    Smoking status: Never     Passive exposure: Never    Smokeless tobacco: Never    Tobacco comments:     non-smoker   Vaping Use    Vaping status: Some Days    Substances: Nicotine, Flavoring    Devices: Disposable, Pre-filled or refillable cartridge   Substance and Sexual Activity    Alcohol use: Yes     Comment: soc, 2x month    Drug use: Not Currently     Types: Cannabis

## 2025-07-21 NOTE — OPERATIVE REPORT
Noemi OQUENDO [facility] Location: OR   [facility] [id number] MRN [id number]   Admission Date 7/21/2025 Operation Date 7/21/2025   Attending Physician Antwan Squires MD Operating Physician Antwan Squires MD       OPERATIVE REPORT   PREOPERATIVE DIAGNOSIS:   1.  Chronic sinusitis  2. Inferior turbinate hypertrophy.   3. Deviated nasal septum  POSTOPERATIVE DIAGNOSIS:   1.  Chronic sinusitis  2. Inferior turbinate hypertrophy.   3. Deviated nasal septum  PROCEDURE PERFORMED:   1.  Bilateral endoscopic total ethmoidectomy  2.  Bilateral endoscopic maxillary antrostomy with tissue removal  3. Outfracture and shaver submucous reduction of inferior turbinates.   4. Medtronic fusion guidance system  5. Septoplasty  ANESTHESIA: General endotracheal.   Procedure and Findings:  After satisfactory general endotracheal anesthesia induction the patient was prepared for the procedure.  1% lidocaine with epinephrine was injected at the lateral nasal wall.  4% cocaine packs were placed to both sides of nose.  There is then prepped and draped in usual sterile fashion.  The [facility] guidance headpiece was placed.  It was calibrated to the shaver and used throughout the case.    Attention was turned the left-hand side.  The 30 degree endoscope was utilized.  The middle turbinate was gently moved medially using a freer elevator.  The backbiting forcep and shaver were used to take down the uncinate process and create a max antrostomy.  Any sinus contents were removed utilizing a curved suction and curved polyp forceps.  Attention was turned to ethmoidectomy.  This was performed in the straight suction straight shaver and 0 to endoscope.  I proceeded through the ethmoidal bulla to the posterior ethmoid air cells taken down ethmoidal air cells as I went.  The Medtronic guidance system was important during this part of the case to identify landmarks including the lamina and the fovea.  Attention was turned to the right-hand side the same  procedure performed.  In this fashion a max antrostomy and ethmoidectomy was performed on the right.  A #15-blade was used to make an incision at the nasal septum. A mucoperichondrial flap was then raised using a [place] elevator. An incision was made at the bony cartilage junction with the Estela elevator, then a mucoperiosteal flap was raised on the other side. Deviated nasal vomer and ethmoid bone were removed using Vandana forceps. Inferiorly, the maxillary crest spur was removed with an osteotome. The septal mucosal edges were then closed with the septal stapler. A small drainage incision was made posteriorly.   Each inferior turbinate was outfractured with a #7 osteotome.  An incision was made in each inferior turbinate.  A mucosal periosteal flap was raised using a Estela elevator.  The Coblator was then used to remove both bone and submucosa thereby reducing the size of the turbinates.  Packing was placed laterally's middle turbinate.  The patient was then awoken extubated and transferred recovery room in stable condition.  FINDINGS: Severely deviated septum to the right with a spur touching the lateral nasal wall along with turbinate hypertrophy left greater than right.  Patient had very hard bone of the uncinate process in the ethmoid sinuses.    Antwan Squires MD

## 2025-07-21 NOTE — ANESTHESIA POSTPROCEDURE EVALUATION
[facility]    Noemi [place] Patient Status:  Hospital Outpatient Surgery   Age/Gender 25 year old female MRN [id number]   Location [facility] POST ANESTHESIA CARE UNIT Attending Antwan Squires MD   Hosp Day # 0 PCP Wilmer Moralez MD       Anesthesia Post-op Note    Bilateral Sinus Endoscopic Ethmoidectomy; Bilateral Maxillary Antrostomy with Tissue Removal; Nasal Septoplasty; Bilateral Out Fracture of Inferior Turbinates; Bilateral Submucous Resection of Inferior Turbinates; Medtronic ENT Navigation Stealth    Procedure Summary       Date: 07/21/25 Room / Location:  MAIN OR 02 / [facility] MAIN OR    Anesthesia Start: 1110 Anesthesia Stop: 1243    Procedures:       Bilateral Sinus Endoscopic Ethmoidectomy; Bilateral Maxillary Antrostomy with Tissue Removal; Nasal Septoplasty; Bilateral Out Fracture of Inferior Turbinates; Bilateral Submucous Resection of Inferior Turbinates; Medtronic ENT Navigation Stealth (Bilateral: Nose)      . (Bilateral: Nose) Diagnosis:       Chronic ethmoidal sinusitis      Chronic sphenoidal sinusitis      Chronic maxillary sinusitis      Deviated nasal septum      Hypertrophy of nasal turbinates      Chronic pansinusitis      Other chronic sinusitis      (Chronic ethmoidal sinusitis [J32.2]Chronic sphenoidal sinusitis [J32.3]Chronic maxillary sinusitis [J32.0]Deviated nasal septum [J34.2]Hypertrophy of nasal turbinates [J34.3]Chronic pansinusitis [J32.4]Other chronic sinusitis [J32.8])    Surgeons: Antwan Squires MD Anesthesiologist: Magdaleno Ortega MD    Anesthesia Type: general ASA Status: 2            Anesthesia Type: general    Vitals Value Taken Time   /85 07/21/25 12:44   Temp 97.3 °F (36.3 °C) 07/21/25 12:25   Pulse 91 07/21/25 12:45   Resp 15 07/21/25 12:45   SpO2 99 % 07/21/25 12:42   Vitals shown include unfiled device data.        Patient Location: PACU    Anesthesia Type: general    Airway Patency: patent and extubated    Postop Pain Control:  adequate    Mental Status: Other    Nausea/Vomiting: none    Cardiopulmonary/Hydration status: stable euvolemic    Complications: no apparent anesthesia related complications    Postop vital signs: stable    Dental Exam: Unchanged from Preop

## 2025-07-21 NOTE — INTERVAL H&P NOTE
Pre-op Diagnosis: Chronic ethmoidal sinusitis [J32.2]  Chronic sphenoidal sinusitis [J32.3]  Chronic maxillary sinusitis [J32.0]  Deviated nasal septum [J34.2]  Hypertrophy of nasal turbinates [J34.3]  Chronic pansinusitis [J32.4]  Other chronic sinusitis [J32.8]    The above referenced H&P was reviewed by Antwan Squires MD on 7/21/2025, the patient was examined and no significant changes have occurred in the patient's condition since the H&P was performed.  I discussed with the patient and/or legal representative the potential benefits, risks and side effects of this procedure; the likelihood of the patient achieving goals; and potential problems that might occur during recuperation.  I discussed reasonable alternatives to the procedure, including risks, benefits and side effects related to the alternatives and risks related to not receiving this procedure.  We will proceed with procedure as planned.

## 2025-07-21 NOTE — ANESTHESIA PROCEDURE NOTES
Airway  Date/Time: 7/21/2025 11:16 AM  Reason: elective      General Information and Staff   Patient location during procedure: OR  Anesthesiologist: Magdaleno Ortega MD  Performed: anesthesiologist   Performed by: Magdaleno Ortega MD  Authorized by: Magdaleno Orteag MD        Indications and Patient Condition  Indications for airway management: anesthesia  Sedation level: deep      Preoxygenated: yesPatient position: sniffing    Mask difficulty assessment: 1 - vent by mask    Final Airway Details    Final airway type: endotracheal airway    Successful airway: ETT  Cuffed: yes   Successful intubation technique: direct laryngoscopy  Endotracheal tube insertion site: oral  Blade: Sarmiento  Blade size: #2  ETT size (mm): 7.0    Cormack-Lehane Classification: grade IIA - partial view of glottis  Placement verified by: capnometry   Measured from: lips  Number of attempts at approach: 1

## 2025-07-22 ENCOUNTER — MED REC SCAN ONLY (OUTPATIENT)
Facility: LOCATION | Age: 25
End: 2025-07-22

## 2025-07-29 ENCOUNTER — OFFICE VISIT (OUTPATIENT)
Facility: LOCATION | Age: 25
End: 2025-07-29
Payer: COMMERCIAL

## 2025-07-29 DIAGNOSIS — J32.0 CHRONIC MAXILLARY SINUSITIS: Primary | ICD-10-CM

## 2025-07-29 PROCEDURE — 31237 NSL/SINS NDSC SURG BX POLYPC: CPT | Performed by: OTOLARYNGOLOGY

## 2025-08-08 ENCOUNTER — OFFICE VISIT (OUTPATIENT)
Facility: LOCATION | Age: 25
End: 2025-08-08

## 2025-08-08 DIAGNOSIS — J32.0 CHRONIC MAXILLARY SINUSITIS: Primary | ICD-10-CM

## 2025-08-08 PROCEDURE — 31237 NSL/SINS NDSC SURG BX POLYPC: CPT | Performed by: OTOLARYNGOLOGY

## (undated) DEVICE — Device

## (undated) DEVICE — NEEDLE SPNL 25GA L3.5IN BLU QNCKE STYL DISP

## (undated) DEVICE — STERILE POLYISOPRENE POWDER-FREE SURGICAL GLOVES: Brand: PROTEXIS

## (undated) DEVICE — SPLINT INTNSL W0.6XL2IN CHITOSAN POLYMR

## (undated) DEVICE — SOL NACL IRRIG 0.9% 1000ML BTL

## (undated) DEVICE — TRAP SPEC UNIV ULT FOR INTELLICART SYS

## (undated) DEVICE — SLEEVE KENDALL SCD EXPRESS MED

## (undated) DEVICE — TRACKER NAVIGATION ENT INSTR FUS

## (undated) DEVICE — SOLUTION IRRIG 1000ML 0.9% NACL USP BTL

## (undated) DEVICE — SYRINGE MED 10ML LL CTRL W/ FNGR GRP CLR BRL

## (undated) DEVICE — GAUZE STERILE 4X4 12PLY

## (undated) DEVICE — PACK CDS SINUS

## (undated) DEVICE — HEAD AND NECK CDS-LF: Brand: MEDLINE INDUSTRIES, INC.

## (undated) DEVICE — SLEEVE COMPR MD KNEE LEN SGL USE KENDALL SCD

## (undated) DEVICE — GLOVE SUR 7.5 SENSICARE PI PIP CRM PWD F

## (undated) NOTE — LETTER
Date & Time: 12/23/2024, 10:48 AM  Patient: Noemi Amezquita  Encounter Provider(s):    Nora Rodriguez PA       To Whom It May Concern:    Noemi Amezquita was seen and treated in our department on 12/23/2024. She should not return to work until 12/24/24 .    If you have any questions or concerns, please do not hesitate to call.        _____________________________  Physician/APC Signature

## (undated) NOTE — LETTER
Date: 10/2/2020    Patient Name: Coretta Howell  : 2000      To Whom it may concern:    Xochilt Gudino is under my care, I'm her primary care physician. She should not lift more than 5pounds from now through 10/10/2020. Thank you.     Sincerely,

## (undated) NOTE — LETTER
Date: 1/8/2020    Patient Name: Lakeisha Ca          To Whom it may concern: This letter has been written at the patient's request. The above patient was treated at the [facility] for a medical condition.     This patient should b

## (undated) NOTE — LETTER
Date & Time: 4/25/2021, 12:15 PM  Patient: Joe Dejesus  Encounter Provider(s):    GUSTAVO George       To Whom It May Concern:    Joe Dejesus was seen and treated in our department on 4/25/2021.  She should not return to work until 4/2

## (undated) NOTE — MR AVS SNAPSHOT
[address] [address]  [phone]               Thank you for choosing us for your health care visit with Froylan Guo MD.  We are glad to serve you and happy to provide you with this sum visit, view other health information and more. To sign up or find more information on getting   Proxy Access to your child’s MyChart go to [email or web address] and click on the   Sign Up Forms link in the Additional Information box on the right. o Eating low-fat dairy products like yogurt, milk, and cheese  o Regularly eating meals together as a family  o Limiting fast food, take out food, and eating out at restaurants  o Preparing foods at home as a family  o Eating a diet rich in calcium  o [address] [place] [address]

## (undated) NOTE — Clinical Note
I had the pleasure of seeing Chasity Ibrahim on 2/15/2023. Please see my attached note.   Sacha Nixon MD FACS [facility]Surgery

## (undated) NOTE — Clinical Note
Date: 1/30/2017    Patient Name: Betsy Campuzano          To Whom it may concern: This letter has been written at the patient's request. The above patient was seen at the [facility] for treatment of a medical condition.     This patie

## (undated) NOTE — MR AVS SNAPSHOT
After Visit Summary   12/3/2019    Amilcar Perales    MRN: [id number]           Visit Information     Date & Time  12/3/2019  2:30 PM Provider  Inez Nath MD Department  [facility] [address], [address] Dept.  Phone  63 VAGINITIS/VAGINOSIS, DNA PROBE [id number] CUSTOM]     VENIPUNCTURE [id number] CPT(R)]     VITAMIN B12 [2017652 CUSTOM]     VITAMIN D, 25-HYDROXY [0558196 CUSTOM]     Future Labs/Procedures Expected by Expires    CBC WITH DIFFERENTIAL WITH PLATELET [[id number] CUST Injury & illness are never convenient. If you are dealing with a   non-emergency, consider your options before heading to an ER.          SAME DAY  APPOINTMENTS  Available at primary care offices      [facility]

## (undated) NOTE — MR AVS SNAPSHOT
[address] [address] [phone]               Thank you for choosing us for your health care visit with [facility]   We are glad to serve you and happy to provide you with this These medications were sent to [address], [address] [facility],   [address], [phone], [address], [address]     Phone:  [phone]    - Sulfamethoxazole-TMP -160 MG T

## (undated) NOTE — LETTER
[address] [place], [place] [address]  Dept: [phone]  Dept Fax: [phone]         October 23, 2019    Patient: Bouchra Juan   YOB: 2000   Date of Visit: 10/23/2019       To Whom It May Concern

## (undated) NOTE — LETTER
Date: 1/2/2018    Patient Name: Ari Muniz          To Whom it may concern: This letter has been written at the patient's request. The above patient was seen at the [facility] for treatment of a medical condition.     This patien

## (undated) NOTE — MR AVS SNAPSHOT
After Visit Summary   1/27/2021    Erum Faust    MRN: [id number]           Visit Information     Date & Time  1/27/2021  1:00 PM Provider  Que Obregon MD Department  [facility] [address], [address] Dept.  Phone  [facility] now offers Video Visits through [address] for adult and pediatric patients. Video Visits are available Monday - Friday for many common conditions such as allergies, colds, cough, fever, rash, sore throat, headache and pink eye.   The cost for a Video Vi [address]   Monday – Friday  4:00 pm – 10:00 pm   Saturday – Sunday  10:00 am – 4:00 pm  WALK-IN CARE  Emergency Medicine Providers  Conditions needing urgent attention, but are   non-life-threatening.     Also available by appointment Average cost  $120*

## (undated) NOTE — LETTER
Date & Time: 6/24/2021, 10:03 AM  Patient: Osorio Nathan  Encounter Provider(s):    COLLIN Sanon       To Whom It May Concern:    Osorio Nathan was seen and treated in our department on 6/24/2021.  She may return to work 6/26/2021

## (undated) NOTE — LETTER
Date: 5/14/2024    Patient Name: Noemi Amezquita          To Whom it may concern:    This letter has been written at the patient's request. The above patient was seen at [facility] for treatment of a medical condition.    This patient should be excused from attending work from 05/14/2024 through 05/15/2024.    The patient may return to work on 05/16/2024.        Sincerely,        COLLIN Patel